# Patient Record
Sex: FEMALE | Race: WHITE | NOT HISPANIC OR LATINO
[De-identification: names, ages, dates, MRNs, and addresses within clinical notes are randomized per-mention and may not be internally consistent; named-entity substitution may affect disease eponyms.]

---

## 2020-02-05 PROBLEM — Z00.00 ENCOUNTER FOR PREVENTIVE HEALTH EXAMINATION: Status: ACTIVE | Noted: 2020-02-05

## 2020-02-12 ENCOUNTER — TRANSCRIPTION ENCOUNTER (OUTPATIENT)
Age: 70
End: 2020-02-12

## 2020-02-12 ENCOUNTER — APPOINTMENT (OUTPATIENT)
Dept: NEUROSURGERY | Facility: CLINIC | Age: 70
End: 2020-02-12
Payer: MEDICAID

## 2020-02-12 DIAGNOSIS — R20.0 ANESTHESIA OF SKIN: ICD-10-CM

## 2020-02-12 PROCEDURE — 99203 OFFICE O/P NEW LOW 30 MIN: CPT

## 2020-02-12 NOTE — HISTORY OF PRESENT ILLNESS
[de-identified] : 70yo [facility] female with limited English speaking ability presents with 5+ year Hx of lancing pain on the left side of the face. She was diagnosed in [facility] and [facility] with TGN and currently being managed with 200mg CBZ up to 4x per day, 75mg lyrica BID and diazepam PRN. She initially had relief from the CBZ but none since. She also has a bulge that forms on the left side of her face occasionally that [place] E[place] with an unremarkable diagnosis. She has lancing pain that started years ago in the V2 distribution and now involved the entire face when it occurs. This is debilitating and sometimes prevents her from eating and drinking. At the most she has 5+ episodes per day. On average she has 1-2 episodes per day. Nothing specifically provokes or relieves the symptoms. No nasal stuffiness. When she has exacerbations she is unable to eat drink or brush her teeth and has severe allodynia (although none at this time). She has not had any advanced imaging of the brain since early 2000's when she had an MRI brain in [facility]. \par \par She also reports intermittent neck pain and RUE paresthesias involving the C5, 6 and 7 dermatomes that clinically correlate into the hand. She denies radicular pain. A full 12-pt ROS was unremarkable other than as noted above.

## 2020-02-12 NOTE — DATA REVIEWED
[de-identified] : Imaging reports of o/p XR L spine and C spine at [facility] on 2/7/20 note DDD of the L spine and C spine with no imaging to review.

## 2020-02-12 NOTE — PHYSICAL EXAM
[General Appearance - In No Acute Distress] : in no acute distress [General Appearance - Alert] : alert [Impaired Insight] : insight and judgment were intact [Oriented To Time, Place, And Person] : oriented to person, place, and time [Affect] : the affect was normal [Person] : oriented to person [Place] : oriented to place [Time] : oriented to time [Cranial Nerves Oculomotor (III)] : extraocular motion intact [Cranial Nerves Optic (II)] : visual acuity intact bilaterally,  pupils equal round and reactive to light [Cranial Nerves Facial (VII)] : face symmetrical [Cranial Nerves Vestibulocochlear (VIII)] : hearing was intact bilaterally [Cranial Nerves Glossopharyngeal (IX)] : tongue and palate midline [Cranial Nerves Accessory (XI - Cranial And Spinal)] : head turning and shoulder shrug symmetric [Cranial Nerves Hypoglossal (XII)] : there was no tongue deviation with protrusion [Motor Strength] : muscle strength was normal in all four extremities [No Muscle Atrophy] : normal bulk in all four extremities [Sensation Tactile Decrease] : light touch was intact [Balance] : balance was intact [2+] : Ankle jerk left 2+ [No Visual Abnormalities] : no visible abnormailities [Full ROM] : full ROM [No Pain with ROM] : no pain with motion in any direction [Normal] : normal [Intact] : all reflexes within normal limits bilaterally [Neck Appearance] : the appearance of the neck was normal [Extraocular Movements] : extraocular movements were intact [] : the neck was supple [Neck Cervical Mass (___cm)] : no neck mass was observed [Abnormal Walk] : normal gait [Involuntary Movements] : no involuntary movements were seen [Motor Tone] : muscle strength and tone were normal [Romberg's Sign] : Romberg's sign was negtive [Dysdiadochokinesia Bilaterally] : not present [Allodynia] : no ~T allodynia present [Tremor] : no tremor present [Coordination - Dysmetria Impaired Finger-to-Nose Bilateral] : not present [Coordination - Dysmetria Impaired Heel-to-Shin Bilateral] : not present [Plantar Reflex Left Only] : normal on the left [Plantar Reflex Right Only] : normal on the right [___] : absent on the left [___] : absent on the right [FreeTextEntry5] : Normal gross sensation to the face to light touch today on exam. Of note an asymmetric and bulge of the soft tissues anterior and inferior to the ear, centered over the mandible and in the region of the parotid gland, non-tender [Galeas] : Galeas's sign was not demonstrated [FreeTextEntry1] : Normal motor exam, non-focal and intact with full strength  [L'Hermitte's] : neck flexion did not produce tingling down the spine/arms [Spurling's - Opposite Side] : Negative Spurling's on opposite side [Spurling's Same Side] : Negative Spurling's on same side [Straight-Leg Raise Test - Right] : straight leg raise  of the right leg was negative [Straight-Leg Raise Test - Left] : straight leg raise of the left leg was negative

## 2020-03-13 ENCOUNTER — FORM ENCOUNTER (OUTPATIENT)
Age: 70
End: 2020-03-13

## 2020-03-14 ENCOUNTER — OUTPATIENT (OUTPATIENT)
Dept: OUTPATIENT SERVICES | Facility: HOSPITAL | Age: 70
LOS: 1 days | End: 2020-03-14
Payer: COMMERCIAL

## 2020-03-14 ENCOUNTER — APPOINTMENT (OUTPATIENT)
Dept: MRI IMAGING | Facility: CLINIC | Age: 70
End: 2020-03-14
Payer: MEDICAID

## 2020-03-14 DIAGNOSIS — G50.0 TRIGEMINAL NEURALGIA: ICD-10-CM

## 2020-03-14 PROCEDURE — 70553 MRI BRAIN STEM W/O & W/DYE: CPT | Mod: 26

## 2020-03-14 PROCEDURE — 72141 MRI NECK SPINE W/O DYE: CPT

## 2020-03-14 PROCEDURE — A9585: CPT

## 2020-03-14 PROCEDURE — 72141 MRI NECK SPINE W/O DYE: CPT | Mod: 26

## 2020-03-14 PROCEDURE — 70553 MRI BRAIN STEM W/O & W/DYE: CPT

## 2020-03-19 ENCOUNTER — APPOINTMENT (OUTPATIENT)
Dept: NEUROLOGY | Facility: CLINIC | Age: 70
End: 2020-03-19

## 2020-03-25 ENCOUNTER — APPOINTMENT (OUTPATIENT)
Dept: NEUROSURGERY | Facility: CLINIC | Age: 70
End: 2020-03-25
Payer: MEDICAID

## 2020-03-25 PROCEDURE — 99214 OFFICE O/P EST MOD 30 MIN: CPT

## 2020-03-25 NOTE — DATA REVIEWED
[de-identified] : MRI brain and FIETSA: ischemic WM changes, there is a looping left SCA that is depressing into the mid portion of the trigeminal nerve in the location of the root entry zone. MRI C spine revealed extensive degenerative changes with a cervical disc hernation and osteophyte complex worst at C5-6 and C6-7. Right C5-6 NF narrowing noted.

## 2020-03-25 NOTE — REASON FOR VISIT
[Family Member] : family member [FreeTextEntry1] : 68yo [facility] female with limited English speaking ability presents with daughter translating: diagnosed in [facility] and [facility] with TGN and currently being managed with 200mg CBZ up to 4x per day, 75mg lyrica BID and diazepam PRN. She has lancing pain that started years ago in the V2 distribution and now involved the entire face when it occurs. This is debilitating and sometimes prevents her from eating and drinking. At the most she has 5+ episodes per day. On average she has 1-2 episodes per day. She also reports intermittent neck pain and RUE paresthesias involving the C5, 6 and 7 dermatomes that clinically correlate into the hand with occasional burning radicular pain into the right hand. She was seen in the office in February and o/p MRI C and brain was ordered. \par \par No significant changes since last visit. She reports her TGN has been stable, no issues since December 2019.

## 2020-03-25 NOTE — PHYSICAL EXAM
[FreeTextEntry1] : Constitutional: alert and in no acute distress. \par Psychiatric: oriented to person, place, and time, insight and judgment were intact and the affect was normal. \par Orientation: oriented to person, oriented to place and oriented to time. \par Cranial Nerves: visual acuity intact bilaterally, pupils equal round and reactive to light, extraocular motion intact, face symmetrical, hearing was intact bilaterally, tongue and palate midline, head turning and shoulder shrug symmetric and there was no tongue deviation with protrusion . Normal gross sensation to the face to light touch today on exam. Of note an asymmetric and bulge of the soft tissues anterior and inferior to the ear, centered over the mandible and in the region of the parotid gland, non-tender. \par Motor: muscle tone was normal in all four extremities, muscle strength was normal in all four extremities, no involuntary movements were seen and normal bulk in all four extremities. \par Normal motor exam, non-focal and intact with full strength. \par Sensory exam: light touch was intact and no allodynia present . Romberg's sign was negtive. \par Coordination:. normal gait. balance was intact. no tremor present. Dysdiadochokinesia was not present. Finger to nose dysmetria was not present. Heel-shin dysmetria was not present. \par Deep tendon reflexes: \par Biceps right 2+. Biceps left 2+.  \par Triceps right 2+. Triceps left 2+.  \par Brachioradialis right 2+. Brachioradialis left 2+.  \par Patella right 2+. Patella left 2+.  \par Ankle jerk right 2+. Ankle jerk left 2+. \par Plantar responses normal on the right, normal on the left. \par Ankle Clonus absent on the right, absent on the left.  \par Other Reflexes: Galeas's sign was not demonstrated. \par Spine: Cervical spine examination demonstrates no visible abnormalities, full ROM and no pain with motion in any direction . neck flexion did not produce tingling down the spine/arms. Negative Spurling's on opposite side. Negative Spurling's on same side. Thoracic spine examination demonstrates no visible abnormailities, full ROM and no pain with motion in any direction. Lumbar/sacral spine examination demonstrates no visible abnormailities, full ROM and no pain with motion in any direction . straight leg raise of the left leg was negative. straight leg raise of the right leg was negative. \par Gait: normal \par Motor Exam: all motor groups within normal limits of strength and tone bilaterally. \par Sensory Exam: all sensory within normal limits bilaterally. \par Deep Tendon Reflexes: all reflexes within normal limits bilaterally. \par Eyes: extraocular movements were intact. \par Neck: the appearance of the neck was normal, the neck was supple and no neck mass was observed. \par Musculoskeletal: normal gait, no involuntary movements were seen and muscle strength and tone were normal.

## 2020-05-18 ENCOUNTER — APPOINTMENT (OUTPATIENT)
Dept: PAIN MANAGEMENT | Facility: CLINIC | Age: 70
End: 2020-05-18

## 2020-05-22 NOTE — REASON FOR VISIT
Oculoplastic Surgeon Procedure Text (A): After obtaining clear surgical margins the patient was sent to oculoplastics for surgical repair.  The patient understands they will receive post-surgical care and follow-up from the referring physician's office. [New Patient Visit] : a new patient visit [Source: ______] : History obtained from [unfilled] [Referred By: _________] : Patient was referred by MYRON

## 2020-06-24 ENCOUNTER — APPOINTMENT (OUTPATIENT)
Dept: NEUROSURGERY | Facility: CLINIC | Age: 70
End: 2020-06-24
Payer: MEDICAID

## 2020-06-24 PROCEDURE — 99213 OFFICE O/P EST LOW 20 MIN: CPT | Mod: 95

## 2020-07-07 ENCOUNTER — APPOINTMENT (OUTPATIENT)
Dept: OTOLARYNGOLOGY | Facility: CLINIC | Age: 70
End: 2020-07-07
Payer: MEDICAID

## 2020-07-07 VITALS
HEIGHT: 63 IN | HEART RATE: 76 BPM | DIASTOLIC BLOOD PRESSURE: 86 MMHG | BODY MASS INDEX: 36.86 KG/M2 | RESPIRATION RATE: 16 BRPM | SYSTOLIC BLOOD PRESSURE: 154 MMHG | WEIGHT: 208 LBS

## 2020-07-07 DIAGNOSIS — Z86.718 PERSONAL HISTORY OF OTHER VENOUS THROMBOSIS AND EMBOLISM: ICD-10-CM

## 2020-07-07 DIAGNOSIS — M50.90 CERVICAL DISC DISORDER, UNSPECIFIED, UNSPECIFIED CERVICAL REGION: ICD-10-CM

## 2020-07-07 DIAGNOSIS — Z86.69 PERSONAL HISTORY OF OTHER DISEASES OF THE NERVOUS SYSTEM AND SENSE ORGANS: ICD-10-CM

## 2020-07-07 DIAGNOSIS — Z86.39 PERSONAL HISTORY OF OTHER ENDOCRINE, NUTRITIONAL AND METABOLIC DISEASE: ICD-10-CM

## 2020-07-07 DIAGNOSIS — I10 ESSENTIAL (PRIMARY) HYPERTENSION: ICD-10-CM

## 2020-07-07 PROCEDURE — 99204 OFFICE O/P NEW MOD 45 MIN: CPT

## 2020-07-07 NOTE — HISTORY OF PRESENT ILLNESS
[Neck Mass] : neck mass [None] : No associated symptoms are reported. [Difficulty Swallowing] : no difficulty swallowing [Otalgia] : no otalgia [de-identified] : Mrs. Laurent is a 71 yo female who is here for parotid mass evaluation.Accompany by daughter Jaison. [place] [place] pt. has a history of trigeminal neuralgia and has had some discomfort. She first noticed mass in 2016 and has noticed the mass increasing in size in the last year. Mass has not gotten biopsied.Pt. feels left facial nubness and pain upon touch and in the  tongue \par Pt. also has cervical disc disease following up with neurologist. 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

## 2020-07-07 NOTE — PHYSICAL EXAM
[Nodule] : nodule [FreeTextEntry1] : mobile 3 cm mass L aprotid.\par \par \par there is no facial weakness [Midline] : trachea located in midline position [Normal] : no rashes

## 2020-07-16 ENCOUNTER — RESULT REVIEW (OUTPATIENT)
Age: 70
End: 2020-07-16

## 2020-07-16 ENCOUNTER — APPOINTMENT (OUTPATIENT)
Dept: ULTRASOUND IMAGING | Facility: IMAGING CENTER | Age: 70
End: 2020-07-16
Payer: MEDICAID

## 2020-07-16 ENCOUNTER — OUTPATIENT (OUTPATIENT)
Dept: OUTPATIENT SERVICES | Facility: HOSPITAL | Age: 70
LOS: 1 days | End: 2020-07-16
Payer: COMMERCIAL

## 2020-07-16 DIAGNOSIS — K11.8 OTHER DISEASES OF SALIVARY GLANDS: ICD-10-CM

## 2020-07-16 PROCEDURE — 88173 CYTOPATH EVAL FNA REPORT: CPT

## 2020-07-16 PROCEDURE — 88173 CYTOPATH EVAL FNA REPORT: CPT | Mod: 26

## 2020-07-16 PROCEDURE — 10005 FNA BX W/US GDN 1ST LES: CPT

## 2020-07-16 PROCEDURE — 88305 TISSUE EXAM BY PATHOLOGIST: CPT | Mod: 26

## 2020-07-16 PROCEDURE — 88172 CYTP DX EVAL FNA 1ST EA SITE: CPT

## 2020-07-16 PROCEDURE — 88305 TISSUE EXAM BY PATHOLOGIST: CPT

## 2020-07-17 LAB — NON-GYNECOLOGICAL CYTOLOGY STUDY: SIGNIFICANT CHANGE UP

## 2020-07-30 ENCOUNTER — APPOINTMENT (OUTPATIENT)
Dept: NEUROLOGY | Facility: CLINIC | Age: 70
End: 2020-07-30
Payer: MEDICAID

## 2020-07-30 VITALS
WEIGHT: 208 LBS | HEIGHT: 63 IN | BODY MASS INDEX: 36.86 KG/M2 | HEART RATE: 72 BPM | SYSTOLIC BLOOD PRESSURE: 127 MMHG | DIASTOLIC BLOOD PRESSURE: 67 MMHG

## 2020-07-30 VITALS — TEMPERATURE: 97.9 F

## 2020-07-30 PROCEDURE — 99203 OFFICE O/P NEW LOW 30 MIN: CPT

## 2020-07-30 RX ORDER — FENOFIBRATE 145 MG/1
145 TABLET, COATED ORAL DAILY
Refills: 0 | Status: ACTIVE | COMMUNITY

## 2020-07-30 RX ORDER — CARBAMAZEPINE 200 MG/1
200 TABLET ORAL
Refills: 0 | Status: ACTIVE | COMMUNITY

## 2020-07-30 RX ORDER — PREGABALIN 75 MG/1
75 CAPSULE ORAL 3 TIMES DAILY
Refills: 0 | Status: ACTIVE | COMMUNITY

## 2020-07-30 RX ORDER — HYDROCHLOROTHIAZIDE 25 MG/1
25 TABLET ORAL DAILY
Refills: 0 | Status: ACTIVE | COMMUNITY

## 2020-07-30 RX ORDER — METOPROLOL TARTRATE 50 MG/1
50 TABLET, FILM COATED ORAL DAILY
Refills: 0 | Status: ACTIVE | COMMUNITY

## 2020-07-30 RX ORDER — ROSUVASTATIN CALCIUM 20 MG/1
20 TABLET, FILM COATED ORAL DAILY
Refills: 0 | Status: ACTIVE | COMMUNITY

## 2020-07-30 RX ORDER — IRBESARTAN 300 MG/1
300 TABLET, FILM COATED ORAL DAILY
Refills: 0 | Status: ACTIVE | COMMUNITY

## 2020-07-30 RX ORDER — PNV NO.95/FERROUS FUM/FOLIC AC 28MG-0.8MG
TABLET ORAL DAILY
Refills: 0 | Status: ACTIVE | COMMUNITY

## 2020-07-30 RX ORDER — MAGNESIUM OXIDE 241.3 MG/1000MG
400 TABLET ORAL DAILY
Refills: 0 | Status: ACTIVE | COMMUNITY

## 2020-07-30 RX ORDER — ALENDRONATE SODIUM 35 MG/1
35 TABLET ORAL
Refills: 0 | Status: ACTIVE | COMMUNITY

## 2020-07-30 RX ORDER — DIAZEPAM 10 MG/1
10 TABLET ORAL
Refills: 0 | Status: ACTIVE | COMMUNITY

## 2020-07-30 NOTE — ASSESSMENT
[FreeTextEntry1] : Will change medication management to trileptal 300mg BID and in 4 weeks will increase to 600mg BID prior to removal of parotid tumor\par \par Will check Na at Sept f/u

## 2020-07-30 NOTE — HISTORY OF PRESENT ILLNESS
[FreeTextEntry1] : Patient history obtained by daughter, she was diagnosed with trigeminal neuralgia in [facility] in late 1990s, carbamazepine helped.  She gets sharp lancinating pain left face in all three divisions trigeminal nerve.  She gets days to weeks of symptoms, and in between she can go for months without symptoms.  She takes lyrica 75mg QD and goes to BID when she gets pain, and takes tegretol 200mg QID with pain, helps but both together make her sleepy and dizzy.  \par \par Has not had a severe episode for many months.

## 2020-07-30 NOTE — PHYSICAL EXAM
[General Appearance - Alert] : alert [General Appearance - In No Acute Distress] : in no acute distress [Person] : oriented to person [Place] : oriented to place [Time] : oriented to time [Short Term Intact] : short term memory intact [Remote Intact] : remote memory intact [Concentration Intact] : normal concentrating ability [Visual Intact] : visual attention was ~T not ~L decreased [Registration Intact] : recent registration memory intact [Naming Objects] : no difficulty naming common objects [Writing A Sentence] : no difficulty writing a sentence [Repeating Phrases] : no difficulty repeating a phrase [Fluency] : fluency intact [Reading] : reading intact [Comprehension] : comprehension intact [Cranial Nerves Oculomotor (III)] : extraocular motion intact [Past History] : adequate knowledge of personal past history [Cranial Nerves Optic (II)] : visual acuity intact bilaterally,  visual fields full to confrontation, pupils equal round and reactive to light [Cranial Nerves Trigeminal (V)] : facial sensation intact symmetrically [Cranial Nerves Vestibulocochlear (VIII)] : hearing was intact bilaterally [Cranial Nerves Facial (VII)] : face symmetrical [Cranial Nerves Hypoglossal (XII)] : there was no tongue deviation with protrusion [Cranial Nerves Glossopharyngeal (IX)] : tongue and palate midline [Cranial Nerves Accessory (XI - Cranial And Spinal)] : head turning and shoulder shrug symmetric [Motor Tone] : muscle tone was normal in all four extremities [No Muscle Atrophy] : normal bulk in all four extremities [Motor Strength] : muscle strength was normal in all four extremities [Motor Handedness Right-Handed] : the patient is right hand dominant [Sensation Tactile Decrease] : light touch was intact [Abnormal Walk] : normal gait [Past-pointing] : there was no past-pointing [Balance] : balance was intact [Tremor] : no tremor present [2+] : Ankle jerk left 2+ [Plantar Reflex Right Only] : normal on the right [Plantar Reflex Left Only] : normal on the left

## 2020-08-28 ENCOUNTER — TRANSCRIPTION ENCOUNTER (OUTPATIENT)
Age: 70
End: 2020-08-28

## 2020-09-15 ENCOUNTER — TRANSCRIPTION ENCOUNTER (OUTPATIENT)
Age: 70
End: 2020-09-15

## 2020-09-21 ENCOUNTER — TRANSCRIPTION ENCOUNTER (OUTPATIENT)
Age: 70
End: 2020-09-21

## 2020-09-30 ENCOUNTER — APPOINTMENT (OUTPATIENT)
Dept: NEUROLOGY | Facility: CLINIC | Age: 70
End: 2020-09-30
Payer: MEDICAID

## 2020-09-30 ENCOUNTER — OUTPATIENT (OUTPATIENT)
Dept: OUTPATIENT SERVICES | Facility: HOSPITAL | Age: 70
LOS: 1 days | End: 2020-09-30
Payer: COMMERCIAL

## 2020-09-30 VITALS
HEART RATE: 73 BPM | DIASTOLIC BLOOD PRESSURE: 82 MMHG | SYSTOLIC BLOOD PRESSURE: 176 MMHG | BODY MASS INDEX: 35.94 KG/M2 | HEIGHT: 63 IN | WEIGHT: 202.82 LBS

## 2020-09-30 VITALS
RESPIRATION RATE: 15 BRPM | TEMPERATURE: 98 F | SYSTOLIC BLOOD PRESSURE: 176 MMHG | WEIGHT: 215.17 LBS | OXYGEN SATURATION: 97 % | DIASTOLIC BLOOD PRESSURE: 88 MMHG | HEART RATE: 73 BPM | HEIGHT: 63 IN

## 2020-09-30 DIAGNOSIS — Z01.818 ENCOUNTER FOR OTHER PREPROCEDURAL EXAMINATION: ICD-10-CM

## 2020-09-30 DIAGNOSIS — Z98.890 OTHER SPECIFIED POSTPROCEDURAL STATES: Chronic | ICD-10-CM

## 2020-09-30 DIAGNOSIS — K11.8 OTHER DISEASES OF SALIVARY GLANDS: ICD-10-CM

## 2020-09-30 DIAGNOSIS — Z90.711 ACQUIRED ABSENCE OF UTERUS WITH REMAINING CERVICAL STUMP: Chronic | ICD-10-CM

## 2020-09-30 PROBLEM — E78.5 HYPERLIPIDEMIA, UNSPECIFIED: Chronic | Status: ACTIVE | Noted: 2020-09-29

## 2020-09-30 PROBLEM — M50.30 OTHER CERVICAL DISC DEGENERATION, UNSPECIFIED CERVICAL REGION: Chronic | Status: ACTIVE | Noted: 2020-09-29

## 2020-09-30 PROBLEM — M51.36 OTHER INTERVERTEBRAL DISC DEGENERATION, LUMBAR REGION: Chronic | Status: ACTIVE | Noted: 2020-09-29

## 2020-09-30 PROBLEM — M19.90 UNSPECIFIED OSTEOARTHRITIS, UNSPECIFIED SITE: Chronic | Status: ACTIVE | Noted: 2020-09-29

## 2020-09-30 PROBLEM — D11.9 BENIGN NEOPLASM OF MAJOR SALIVARY GLAND, UNSPECIFIED: Chronic | Status: ACTIVE | Noted: 2020-09-29

## 2020-09-30 PROBLEM — I10 ESSENTIAL (PRIMARY) HYPERTENSION: Chronic | Status: ACTIVE | Noted: 2020-09-29

## 2020-09-30 PROBLEM — M79.89 OTHER SPECIFIED SOFT TISSUE DISORDERS: Chronic | Status: ACTIVE | Noted: 2020-09-29

## 2020-09-30 PROBLEM — G50.0 TRIGEMINAL NEURALGIA: Chronic | Status: ACTIVE | Noted: 2020-09-29

## 2020-09-30 LAB
ALBUMIN SERPL ELPH-MCNC: 4.5 G/DL — SIGNIFICANT CHANGE UP (ref 3.3–5)
ALP SERPL-CCNC: 41 U/L — SIGNIFICANT CHANGE UP (ref 40–120)
ALT FLD-CCNC: 50 U/L — HIGH (ref 10–45)
ANION GAP SERPL CALC-SCNC: 11 MMOL/L — SIGNIFICANT CHANGE UP (ref 5–17)
AST SERPL-CCNC: 37 U/L — SIGNIFICANT CHANGE UP (ref 10–40)
BILIRUB SERPL-MCNC: 0.3 MG/DL — SIGNIFICANT CHANGE UP (ref 0.2–1.2)
BUN SERPL-MCNC: 12 MG/DL — SIGNIFICANT CHANGE UP (ref 7–23)
CALCIUM SERPL-MCNC: 9.9 MG/DL — SIGNIFICANT CHANGE UP (ref 8.4–10.5)
CHLORIDE SERPL-SCNC: 93 MMOL/L — LOW (ref 96–108)
CO2 SERPL-SCNC: 24 MMOL/L — SIGNIFICANT CHANGE UP (ref 22–31)
CREAT SERPL-MCNC: 0.82 MG/DL — SIGNIFICANT CHANGE UP (ref 0.5–1.3)
GLUCOSE SERPL-MCNC: 99 MG/DL — SIGNIFICANT CHANGE UP (ref 70–99)
HCT VFR BLD CALC: 35.6 % — SIGNIFICANT CHANGE UP (ref 34.5–45)
HGB BLD-MCNC: 12.6 G/DL — SIGNIFICANT CHANGE UP (ref 11.5–15.5)
MCHC RBC-ENTMCNC: 31.3 PG — SIGNIFICANT CHANGE UP (ref 27–34)
MCHC RBC-ENTMCNC: 35.4 GM/DL — SIGNIFICANT CHANGE UP (ref 32–36)
MCV RBC AUTO: 88.3 FL — SIGNIFICANT CHANGE UP (ref 80–100)
NRBC # BLD: 0 /100 WBCS — SIGNIFICANT CHANGE UP (ref 0–0)
PLATELET # BLD AUTO: 300 K/UL — SIGNIFICANT CHANGE UP (ref 150–400)
POTASSIUM SERPL-MCNC: 4.5 MMOL/L — SIGNIFICANT CHANGE UP (ref 3.5–5.3)
POTASSIUM SERPL-SCNC: 4.5 MMOL/L — SIGNIFICANT CHANGE UP (ref 3.5–5.3)
PROT SERPL-MCNC: 8.2 G/DL — SIGNIFICANT CHANGE UP (ref 6–8.3)
RBC # BLD: 4.03 M/UL — SIGNIFICANT CHANGE UP (ref 3.8–5.2)
RBC # FLD: 11.5 % — SIGNIFICANT CHANGE UP (ref 10.3–14.5)
SODIUM SERPL-SCNC: 128 MMOL/L — LOW (ref 135–145)
WBC # BLD: 6.4 K/UL — SIGNIFICANT CHANGE UP (ref 3.8–10.5)
WBC # FLD AUTO: 6.4 K/UL — SIGNIFICANT CHANGE UP (ref 3.8–10.5)

## 2020-09-30 PROCEDURE — 99213 OFFICE O/P EST LOW 20 MIN: CPT

## 2020-09-30 PROCEDURE — 85027 COMPLETE CBC AUTOMATED: CPT

## 2020-09-30 PROCEDURE — G0463: CPT

## 2020-09-30 PROCEDURE — 80053 COMPREHEN METABOLIC PANEL: CPT

## 2020-09-30 RX ORDER — OXCARBAZEPINE 600 MG/1
600 TABLET, FILM COATED ORAL TWICE DAILY
Qty: 60 | Refills: 5 | Status: ACTIVE | COMMUNITY
Start: 2020-09-30 | End: 1900-01-01

## 2020-09-30 NOTE — ASSESSMENT
[FreeTextEntry1] : Will increase trileptal to 600mg BID, and I have told her she can sporadically take an extra 600mg if needed.  \par \par Will check level and sodium today.  \par \par She appears to be maximized for upcoming parotid surgery Oct 12\par \par f/u with me in 6 months or PRN

## 2020-09-30 NOTE — H&P PST ADULT - NSICDXPASTMEDICALHX_GEN_ALL_CORE_FT
PAST MEDICAL HISTORY:  DDD (degenerative disc disease), cervical     DDD (degenerative disc disease), lumbar     Hyperlipidemia     Hypertension     Leg swelling     Osteoarthritis     Trigeminal neuralgia     Warthin's tumor left parotid

## 2020-09-30 NOTE — H&P PST ADULT - NSANTHOSAYNRD_GEN_A_CORE
No. CHAU screening performed.  STOP BANG Legend: 0-2 = LOW Risk; 3-4 = INTERMEDIATE Risk; 5-8 = HIGH Risk

## 2020-09-30 NOTE — H&P PST ADULT - FALLEN IN THE PAST
2/7/2017    PREOPERATIVE DIAGNOSIS: indwelling band with GERD    POSTOPERATIVE DIAGNOSIS: Same and antritis. Single small esophageal erosion at 40 cm    PROCEDURES PERFORMED: Esophagogastroduodenoscopy  with biopsies. SURGEON: Alicia Kurtz. Yung Larsen MD, FACS    ANESTHETIC: MAC anesthetic. ESTIMATED BLOOD LOSS: 5 mL. SPECIMEN: 1. Antral biopsies for H pylori and permanent  2. Fundic biopsies    FINDINGS:   1. LES at 41 cm  2. Single esophageal erosion at 40 cm  3. Mild gastritis/antritis  4. Normal duodenum  5. No evidence of band erosion or slippage    COMPLICATIONS: none  IMPLANTS: none    DESCRIPTION OF PROCEDURE: Patient was sedated in the endoscopy suite and an endoscope was passed transorally through the esophagus into the gastric pouch. On entering the gastric pouch, the gastroesophageal junction was noted to be well defined at 41 cm, without hiatal hernia. A small esophageal erosion was noted at 40 cm separate from the GE junctionLap band  was identified next at 45 cm. The scope was passed through the band channel and  retroflexed in the stomach. No evidence of erosion or slippage was noted. The scope was passed through the antrum and pyloris into the second portion of the duodenum. No duodenal pathology was noted. Biopsy forceps were used to biopsy 2 separate points in the antrum and two other points in the fundus based on antritis and gastropathy findings. These were hemostatic immediately after their biopsies were complete. Air was suctioned from the stomach and the endoscope was removed through the esophagus. No additional pathology identified. no

## 2020-09-30 NOTE — H&P PST ADULT - NSICDXFAMILYHX_GEN_ALL_CORE_FT
FAMILY HISTORY:  Father  Still living? No  Family history of heart attack, Age at diagnosis: Age Unknown    Mother  Still living? No  Family history of leukemia, Age at diagnosis: Age Unknown    Sibling  Still living? Yes, Estimated age: 61-70  Family history of breast cancer, Age at diagnosis: Age Unknown

## 2020-09-30 NOTE — PHYSICAL EXAM
[Person] : oriented to person [Place] : oriented to place [Time] : oriented to time [Short Term Intact] : short term memory intact [Remote Intact] : remote memory intact [Registration Intact] : recent registration memory intact [Concentration Intact] : normal concentrating ability [Visual Intact] : visual attention was ~T not ~L decreased [Naming Objects] : no difficulty naming common objects [Repeating Phrases] : no difficulty repeating a phrase [Writing A Sentence] : no difficulty writing a sentence [Fluency] : fluency intact [Comprehension] : comprehension intact [Reading] : reading intact [Past History] : adequate knowledge of personal past history [Cranial Nerves Optic (II)] : visual acuity intact bilaterally,  visual fields full to confrontation, pupils equal round and reactive to light [Cranial Nerves Oculomotor (III)] : extraocular motion intact [Cranial Nerves Trigeminal (V)] : facial sensation intact symmetrically [Cranial Nerves Facial (VII)] : face symmetrical [Cranial Nerves Vestibulocochlear (VIII)] : hearing was intact bilaterally [Cranial Nerves Glossopharyngeal (IX)] : tongue and palate midline [Cranial Nerves Accessory (XI - Cranial And Spinal)] : head turning and shoulder shrug symmetric [Cranial Nerves Hypoglossal (XII)] : there was no tongue deviation with protrusion [Motor Tone] : muscle tone was normal in all four extremities [Motor Strength] : muscle strength was normal in all four extremities [No Muscle Atrophy] : normal bulk in all four extremities [Motor Handedness Right-Handed] : the patient is right hand dominant [Sensation Tactile Decrease] : light touch was intact [Abnormal Walk] : normal gait [Balance] : balance was intact [Past-pointing] : there was no past-pointing [Tremor] : no tremor present [2+] : Ankle jerk left 2+ [Plantar Reflex Right Only] : normal on the right [Plantar Reflex Left Only] : normal on the left

## 2020-09-30 NOTE — H&P PST ADULT - NSICDXPASTSURGICALHX_GEN_ALL_CORE_FT
PAST SURGICAL HISTORY:  H/O varicose vein ligation 2006 left and 2001 right    History of partial hysterectomy benign mass, 1998

## 2020-09-30 NOTE — HISTORY OF PRESENT ILLNESS
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  \par \par

## 2020-09-30 NOTE — H&P PST ADULT - ASSESSMENT
71 yoanny Guzman sp F for left parotidectomy w [facility] section     Medical clearance pending w labs

## 2020-10-08 DIAGNOSIS — Z01.818 ENCOUNTER FOR OTHER PREPROCEDURAL EXAMINATION: ICD-10-CM

## 2020-10-09 ENCOUNTER — APPOINTMENT (OUTPATIENT)
Dept: DISASTER EMERGENCY | Facility: CLINIC | Age: 70
End: 2020-10-09

## 2020-10-09 LAB — SARS-COV-2 N GENE NPH QL NAA+PROBE: NOT DETECTED

## 2020-10-12 ENCOUNTER — TRANSCRIPTION ENCOUNTER (OUTPATIENT)
Age: 70
End: 2020-10-12

## 2020-10-12 ENCOUNTER — INPATIENT (INPATIENT)
Facility: HOSPITAL | Age: 70
LOS: 2 days | Discharge: HOME | DRG: 641 | End: 2020-10-15
Attending: INTERNAL MEDICINE | Admitting: INTERNAL MEDICINE
Payer: COMMERCIAL

## 2020-10-12 ENCOUNTER — APPOINTMENT (OUTPATIENT)
Dept: OTOLARYNGOLOGY | Facility: HOSPITAL | Age: 70
End: 2020-10-12

## 2020-10-12 VITALS
HEART RATE: 71 BPM | TEMPERATURE: 98 F | SYSTOLIC BLOOD PRESSURE: 163 MMHG | WEIGHT: 215.17 LBS | RESPIRATION RATE: 16 BRPM | DIASTOLIC BLOOD PRESSURE: 81 MMHG | HEIGHT: 63 IN | OXYGEN SATURATION: 96 %

## 2020-10-12 DIAGNOSIS — K11.8 OTHER DISEASES OF SALIVARY GLANDS: ICD-10-CM

## 2020-10-12 DIAGNOSIS — Z90.711 ACQUIRED ABSENCE OF UTERUS WITH REMAINING CERVICAL STUMP: Chronic | ICD-10-CM

## 2020-10-12 DIAGNOSIS — Z98.890 OTHER SPECIFIED POSTPROCEDURAL STATES: Chronic | ICD-10-CM

## 2020-10-12 DIAGNOSIS — E87.1 HYPO-OSMOLALITY AND HYPONATREMIA: ICD-10-CM

## 2020-10-12 LAB
ANION GAP SERPL CALC-SCNC: 11 MMOL/L — SIGNIFICANT CHANGE UP (ref 5–17)
ANION GAP SERPL CALC-SCNC: 8 MMOL/L — SIGNIFICANT CHANGE UP (ref 5–17)
ANION GAP SERPL CALC-SCNC: 9 MMOL/L — SIGNIFICANT CHANGE UP (ref 5–17)
ANION GAP SERPL CALC-SCNC: 9 MMOL/L — SIGNIFICANT CHANGE UP (ref 5–17)
APPEARANCE UR: CLEAR — SIGNIFICANT CHANGE UP
BILIRUB UR-MCNC: NEGATIVE — SIGNIFICANT CHANGE UP
BUN SERPL-MCNC: 15 MG/DL — SIGNIFICANT CHANGE UP (ref 7–23)
BUN SERPL-MCNC: 7 MG/DL — SIGNIFICANT CHANGE UP (ref 7–23)
BUN SERPL-MCNC: 9 MG/DL — SIGNIFICANT CHANGE UP (ref 7–23)
BUN SERPL-MCNC: 9 MG/DL — SIGNIFICANT CHANGE UP (ref 7–23)
CALCIUM SERPL-MCNC: 8.6 MG/DL — SIGNIFICANT CHANGE UP (ref 8.4–10.5)
CALCIUM SERPL-MCNC: 8.8 MG/DL — SIGNIFICANT CHANGE UP (ref 8.4–10.5)
CALCIUM SERPL-MCNC: 8.9 MG/DL — SIGNIFICANT CHANGE UP (ref 8.4–10.5)
CALCIUM SERPL-MCNC: 9.7 MG/DL — SIGNIFICANT CHANGE UP (ref 8.4–10.5)
CHLORIDE SERPL-SCNC: 87 MMOL/L — LOW (ref 96–108)
CHLORIDE SERPL-SCNC: 87 MMOL/L — LOW (ref 96–108)
CHLORIDE SERPL-SCNC: 89 MMOL/L — LOW (ref 96–108)
CHLORIDE SERPL-SCNC: 89 MMOL/L — LOW (ref 96–108)
CHLORIDE UR-SCNC: 85 MMOL/L — SIGNIFICANT CHANGE UP
CO2 SERPL-SCNC: 22 MMOL/L — SIGNIFICANT CHANGE UP (ref 22–31)
CO2 SERPL-SCNC: 24 MMOL/L — SIGNIFICANT CHANGE UP (ref 22–31)
CO2 SERPL-SCNC: 25 MMOL/L — SIGNIFICANT CHANGE UP (ref 22–31)
CO2 SERPL-SCNC: 25 MMOL/L — SIGNIFICANT CHANGE UP (ref 22–31)
COLOR SPEC: YELLOW — SIGNIFICANT CHANGE UP
CREAT ?TM UR-MCNC: 35 MG/DL — SIGNIFICANT CHANGE UP
CREAT SERPL-MCNC: 0.65 MG/DL — SIGNIFICANT CHANGE UP (ref 0.5–1.3)
CREAT SERPL-MCNC: 0.66 MG/DL — SIGNIFICANT CHANGE UP (ref 0.5–1.3)
CREAT SERPL-MCNC: 0.71 MG/DL — SIGNIFICANT CHANGE UP (ref 0.5–1.3)
CREAT SERPL-MCNC: 1.05 MG/DL — SIGNIFICANT CHANGE UP (ref 0.5–1.3)
DIFF PNL FLD: NEGATIVE — SIGNIFICANT CHANGE UP
GLUCOSE SERPL-MCNC: 112 MG/DL — HIGH (ref 70–99)
GLUCOSE SERPL-MCNC: 114 MG/DL — HIGH (ref 70–99)
GLUCOSE SERPL-MCNC: 115 MG/DL — HIGH (ref 70–99)
GLUCOSE SERPL-MCNC: 116 MG/DL — HIGH (ref 70–99)
GLUCOSE UR QL: NEGATIVE — SIGNIFICANT CHANGE UP
KETONES UR-MCNC: NEGATIVE — SIGNIFICANT CHANGE UP
LEUKOCYTE ESTERASE UR-ACNC: NEGATIVE — SIGNIFICANT CHANGE UP
NITRITE UR-MCNC: NEGATIVE — SIGNIFICANT CHANGE UP
OSMOLALITY UR: 341 MOSM/KG — SIGNIFICANT CHANGE UP (ref 50–1200)
PH UR: 8 — SIGNIFICANT CHANGE UP (ref 5–8)
POTASSIUM SERPL-MCNC: 3.8 MMOL/L — SIGNIFICANT CHANGE UP (ref 3.5–5.3)
POTASSIUM SERPL-MCNC: 4 MMOL/L — SIGNIFICANT CHANGE UP (ref 3.5–5.3)
POTASSIUM SERPL-MCNC: 4.4 MMOL/L — SIGNIFICANT CHANGE UP (ref 3.5–5.3)
POTASSIUM SERPL-MCNC: 4.6 MMOL/L — SIGNIFICANT CHANGE UP (ref 3.5–5.3)
POTASSIUM SERPL-SCNC: 3.8 MMOL/L — SIGNIFICANT CHANGE UP (ref 3.5–5.3)
POTASSIUM SERPL-SCNC: 4 MMOL/L — SIGNIFICANT CHANGE UP (ref 3.5–5.3)
POTASSIUM SERPL-SCNC: 4.4 MMOL/L — SIGNIFICANT CHANGE UP (ref 3.5–5.3)
POTASSIUM SERPL-SCNC: 4.6 MMOL/L — SIGNIFICANT CHANGE UP (ref 3.5–5.3)
PROT ?TM UR-MCNC: 4 MG/DL — SIGNIFICANT CHANGE UP (ref 0–12)
PROT UR-MCNC: NEGATIVE — SIGNIFICANT CHANGE UP
PROT/CREAT UR-RTO: 0.1 RATIO — SIGNIFICANT CHANGE UP (ref 0–0.2)
SODIUM SERPL-SCNC: 120 MMOL/L — CRITICAL LOW (ref 135–145)
SODIUM SERPL-SCNC: 120 MMOL/L — CRITICAL LOW (ref 135–145)
SODIUM SERPL-SCNC: 122 MMOL/L — LOW (ref 135–145)
SODIUM SERPL-SCNC: 123 MMOL/L — LOW (ref 135–145)
SODIUM UR-SCNC: 110 MMOL/L — SIGNIFICANT CHANGE UP
SP GR SPEC: 1.01 — SIGNIFICANT CHANGE UP (ref 1.01–1.02)
URATE UR-MCNC: 26.6 MG/DL — SIGNIFICANT CHANGE UP
UROBILINOGEN FLD QL: NEGATIVE — SIGNIFICANT CHANGE UP

## 2020-10-12 RX ORDER — ATORVASTATIN CALCIUM 80 MG/1
80 TABLET, FILM COATED ORAL AT BEDTIME
Refills: 0 | Status: DISCONTINUED | OUTPATIENT
Start: 2020-10-12 | End: 2020-10-15

## 2020-10-12 RX ORDER — MULTIVIT-MIN/FERROUS GLUCONATE 9 MG/15 ML
1 LIQUID (ML) ORAL DAILY
Refills: 0 | Status: DISCONTINUED | OUTPATIENT
Start: 2020-10-12 | End: 2020-10-15

## 2020-10-12 RX ORDER — METOPROLOL TARTRATE 50 MG
25 TABLET ORAL ONCE
Refills: 0 | Status: COMPLETED | OUTPATIENT
Start: 2020-10-12 | End: 2020-10-12

## 2020-10-12 RX ORDER — SODIUM CHLORIDE 9 MG/ML
1000 INJECTION, SOLUTION INTRAVENOUS
Refills: 0 | Status: DISCONTINUED | OUTPATIENT
Start: 2020-10-12 | End: 2020-10-12

## 2020-10-12 RX ORDER — METOPROLOL TARTRATE 50 MG
50 TABLET ORAL DAILY
Refills: 0 | Status: DISCONTINUED | OUTPATIENT
Start: 2020-10-13 | End: 2020-10-14

## 2020-10-12 RX ORDER — HYDRALAZINE HCL 50 MG
10 TABLET ORAL EVERY 6 HOURS
Refills: 0 | Status: DISCONTINUED | OUTPATIENT
Start: 2020-10-12 | End: 2020-10-13

## 2020-10-12 RX ORDER — LOSARTAN POTASSIUM 100 MG/1
100 TABLET, FILM COATED ORAL DAILY
Refills: 0 | Status: DISCONTINUED | OUTPATIENT
Start: 2020-10-12 | End: 2020-10-14

## 2020-10-12 RX ORDER — DIAZEPAM 5 MG
10 TABLET ORAL THREE TIMES A DAY
Refills: 0 | Status: DISCONTINUED | OUTPATIENT
Start: 2020-10-12 | End: 2020-10-15

## 2020-10-12 RX ORDER — FENOFIBRATE,MICRONIZED 130 MG
145 CAPSULE ORAL DAILY
Refills: 0 | Status: DISCONTINUED | OUTPATIENT
Start: 2020-10-12 | End: 2020-10-15

## 2020-10-12 RX ORDER — OXCARBAZEPINE 300 MG/1
300 TABLET, FILM COATED ORAL
Refills: 0 | Status: DISCONTINUED | OUTPATIENT
Start: 2020-10-12 | End: 2020-10-15

## 2020-10-12 RX ORDER — PREGABALIN 225 MG/1
1000 CAPSULE ORAL DAILY
Refills: 0 | Status: DISCONTINUED | OUTPATIENT
Start: 2020-10-12 | End: 2020-10-15

## 2020-10-12 RX ORDER — HYDRALAZINE HCL 50 MG
10 TABLET ORAL ONCE
Refills: 0 | Status: COMPLETED | OUTPATIENT
Start: 2020-10-12 | End: 2020-10-12

## 2020-10-12 RX ORDER — MAGNESIUM OXIDE 400 MG ORAL TABLET 241.3 MG
400 TABLET ORAL DAILY
Refills: 0 | Status: DISCONTINUED | OUTPATIENT
Start: 2020-10-12 | End: 2020-10-15

## 2020-10-12 RX ADMIN — Medication 145 MILLIGRAM(S): at 17:34

## 2020-10-12 RX ADMIN — OXCARBAZEPINE 300 MILLIGRAM(S): 300 TABLET, FILM COATED ORAL at 17:39

## 2020-10-12 RX ADMIN — ATORVASTATIN CALCIUM 80 MILLIGRAM(S): 80 TABLET, FILM COATED ORAL at 21:08

## 2020-10-12 RX ADMIN — Medication 25 MILLIGRAM(S): at 15:49

## 2020-10-12 RX ADMIN — Medication 1 TABLET(S): at 17:35

## 2020-10-12 RX ADMIN — MAGNESIUM OXIDE 400 MG ORAL TABLET 400 MILLIGRAM(S): 241.3 TABLET ORAL at 17:34

## 2020-10-12 RX ADMIN — Medication 1 TABLET(S): at 17:34

## 2020-10-12 RX ADMIN — SODIUM CHLORIDE 50 MILLILITER(S): 9 INJECTION, SOLUTION INTRAVENOUS at 10:07

## 2020-10-12 RX ADMIN — Medication 10 MILLIGRAM(S): at 15:51

## 2020-10-12 RX ADMIN — PREGABALIN 1000 MICROGRAM(S): 225 CAPSULE ORAL at 17:34

## 2020-10-12 NOTE — H&P ADULT - NSHPLABSRESULTS_GEN_ALL_CORE
Labs      10-12    123<L>  |  89<L>  |  7   ----------------------------<  116<H>  4.0   |  25  |  0.65    Ca    8.9      12 Oct 2020 14:30              Lactic Acid Trend          Radiology

## 2020-10-12 NOTE — H&P ADULT - NSHPREVIEWOFSYSTEMS_GEN_ALL_CORE
Review of Systems:   	CONSTITUTIONAL: Denies fever, weight loss, or fatigue  	ENT: Denies dysphagia, difficulty chewing, tinnitus, blurred vision, double vision  	RESPIRATORY: Denies cough, wheeze, hemoptysis, shortness of breath  	CARDIOVASCULAR: Denies chest pain, palpitations irregular HR  	GASTROINTESTINAL:  Denies nausea, vomiting, abdominal pain, diarrhea, constipation, melena, BRBPR, food intolerances  	GENITOURINARY: Denies dysuria, hematuria, urinary frequency, urinary incontinence  	NEUROLOGICAL: Denies headaches, syncope, near syncope, dizziness, lightheadedness, headaches, seizures  	SKIN: Denies rashes, masses, bruising, lesions   	MUSCULOSKELETAL: Denies history of OA or gout, joint swelling  	PSYCHIATRIC: Denies depression, anxiety, mood swings, or difficulty sleeping  	HEME: Denies history of DVT/PE, blood transfusion Review of Systems:   	CONSTITUTIONAL: Denies fever, weight loss, or fatigue  	ENT: + dysphagia, chewing. Denies tinnitus, blurred vision, double vision  	RESPIRATORY: Denies cough, wheeze, hemoptysis, shortness of breath  	CARDIOVASCULAR: Denies chest pain, palpitations irregular HR  	GASTROINTESTINAL:  Denies nausea, vomiting, abdominal pain, diarrhea, constipation, melena, BRBPR, food intolerances  	GENITOURINARY: Denies dysuria, hematuria, urinary frequency, urinary incontinence  	NEUROLOGICAL: Denies headaches, syncope, near syncope, dizziness, lightheadedness, headaches, seizures  	SKIN: Denies rashes, masses, bruising, lesions   	MUSCULOSKELETAL: Denies history of OA or gout, joint swelling  	PSYCHIATRIC: Denies depression, anxiety, mood swings, or difficulty sleeping  	HEME: Denies history of DVT/PE, blood transfusion

## 2020-10-12 NOTE — CONSULT NOTE ADULT - SUBJECTIVE AND OBJECTIVE BOX
Neurology consult    RENE [id number]     Patient is a 70y old  Female who presents with a chief complaint of Hyponatremia (12 Oct 2020 15:44)      HPI:  Source: Patient  Reliability: Good    CC:  Hyponatremia    HPI:  This is a 70 year-old-female with PMHx of HTN who was admitted to the critical care unit for hyponatremia. Patient was originally presented to the ambulatory surgery unit today for left parotid gland tumor removal. Presurgical testing labs showed sodium of 120. Patient was recently taking hydrochlorothiazide and Trileptal for trigeminal. Critical care was called for hyponatremia and electrolyte imbalance.     Patient takes oxcarbazepine 600 mg Q 12 hour for last 2 weeks previously receiving 300 mg Q 12 hours.  In past she received carbamazepine with partial response for her trigeminal neuralgia.    PMH    PSH    Allergies    Keflex (Rash)    Intolerances        MEDICATIONS  (STANDING):  atorvastatin 80 milliGRAM(s) Oral at bedtime  calcium carbonate 1250 mG  + Vitamin D (OsCal 500 + D) 1 Tablet(s) Oral two times a day  cyanocobalamin 1000 MICROGram(s) Oral daily  fenofibrate Tablet 145 milliGRAM(s) Oral daily  hydrALAZINE Injectable 10 milliGRAM(s) IV Push once  losartan 100 milliGRAM(s) Oral daily  magnesium oxide 400 milliGRAM(s) Oral daily  metoprolol tartrate 25 milliGRAM(s) Oral once  multivitamin/minerals 1 Tablet(s) Oral daily    MEDICATIONS  (PRN):  diazepam    Tablet 10 milliGRAM(s) Oral three times a day PRN anxiety  hydrALAZINE Injectable 10 milliGRAM(s) IV Push every 6 hours PRN SBP > 140      Psoc    Pfam          Vital Signs Last 24 Hrs  T(C): 36.6 (12 Oct 2020 12:00), Max: 37 (12 Oct 2020 11:12)  T(F): 97.8 (12 Oct 2020 12:00), Max: 98.6 (12 Oct 2020 11:12)  HR: 67 (12 Oct 2020 15:00) (58 - 80)  BP: 138/73 (12 Oct 2020 15:00) (135/69 - 179/93)  BP(mean): 89 (12 Oct 2020 15:00) (89 - 100)  RR: 20 (12 Oct 2020 15:00) (7 - 25)  SpO2: 97% (12 Oct 2020 15:00) (96% - 100%)     (12 Oct 2020 15:44)      REVIEW OF SYSTEMS:    Constitutional: No fever, chills, fatigue, weakness  Eyes: no eye pain, visual disturbances, or discharge  ENT:  No difficulty hearing, tinnitus, vertigo; No sinus or throat pain  Neck: No pain or stiffness  Respiratory: No cough, dyspnea, wheezing   Cardiovascular: No chest pain, palpitations,   Gastrointestinal: No abdominal or epigastric pain. No nausea, vomiting  No diarrhea or constipation.   Genitourinary: No dysuria, frequency, hematuria or incontinence  Neurological: No headaches, lightheadedness, vertigo, numbness or tremors  Psychiatric: No depression, anxiety, mood swings or difficulty sleeping  Musculoskeletal: No joint pain or swelling; No muscle, back or extremity pain  Skin: No itching, burning, rashes or lesions   Lymph Nodes: No enlarged glands  Endocrine: No heat or cold intolerance; No hair loss, No h/o diabetes or thyroid dysfunction  Allergy and Immunologic: No hives or eczema    MEDICATIONS    atorvastatin 80 milliGRAM(s) Oral at bedtime  calcium carbonate 1250 mG  + Vitamin D (OsCal 500 + D) 1 Tablet(s) Oral two times a day  cyanocobalamin 1000 MICROGram(s) Oral daily  diazepam    Tablet 10 milliGRAM(s) Oral three times a day PRN  fenofibrate Tablet 145 milliGRAM(s) Oral daily  hydrALAZINE Injectable 10 milliGRAM(s) IV Push every 6 hours PRN  losartan 100 milliGRAM(s) Oral daily  magnesium oxide 400 milliGRAM(s) Oral daily  multivitamin/minerals 1 Tablet(s) Oral daily  OXcarbazepine 300 milliGRAM(s) Oral two times a day      PMH: Warthin&#x27;s tumor    Leg swelling    Hypertension    DDD (degenerative disc disease), cervical    DDD (degenerative disc disease), lumbar    Osteoarthritis    Hyperlipidemia    Trigeminal neuralgia         PSH: History of partial hysterectomy    H/O varicose vein ligation        Family history:   FAMILY HISTORY:  Family history of leukemia (Mother)    Family history of breast cancer (Sibling)    Family history of heart attack (Father)        SOCIAL HISTORY:  No history of tobacco or alcohol use     Allergies    Keflex (Rash)    Intolerances        Height (cm): 160 (10-12 @ 12:00)  Weight (kg): 98.1 (10- @ 12:00)  BMI (kg/m2): 38.3 (10-12 @ 12:00)    Vital Signs Last 24 Hrs  T(C): 36.8 (12 Oct 2020 15:00), Max: 37 (12 Oct 2020 11:12)  T(F): 98.3 (12 Oct 2020 15:00), Max: 98.6 (12 Oct 2020 11:12)  HR: 72 (12 Oct 2020 16:00) (58 - 80)  BP: 118/54 (12 Oct 2020 16:00) (118/54 - 179/93)  BP(mean): 72 (12 Oct 2020 16:00) (72 - 100)  RR: 18 (12 Oct 2020 16:00) (7 - 25)  SpO2: 98% (12 Oct 2020 16:00) (96% - 100%)      On Neurological Examination:    Head: normocephalic Neck: supple no carotid bruits    Mental Status - Patient is alert, awake, oriented speech intact    Cranial Nerves - PERRL, EOMI, VFF, normal V through XII no nystagmus    Motor Exam :  No drift normal strength tone and coordination no abnormal movements no focality    Sensory    Intact to pin    Reflexes:  symmetric plantars downgoing                                                             LABS:    Urinalysis Basic - ( 12 Oct 2020 11:15 )    Color: Yellow / Appearance: Clear / S.010 / pH: x  Gluc: x / Ketone: Negative  / Bili: Negative / Urobili: Negative   Blood: x / Protein: Negative / Nitrite: Negative   Leuk Esterase: Negative / RBC: x / WBC x   Sq Epi: x / Non Sq Epi: x / Bacteria: x      10-12    123<L>  |  89<L>  |  7   ----------------------------<  116<H>  4.0   |  25  |  0.65    Ca    8.9      12 Oct 2020 14:30        Hemoglobin A1C:

## 2020-10-12 NOTE — PROVIDER CONTACT NOTE (CRITICAL VALUE NOTIFICATION) - ACTION/TREATMENT ORDERED:
Dr Vance from anesthesia notified. Repeat sodium level ordered
Surgery cancelled and will be admitted

## 2020-10-12 NOTE — H&P ADULT - HISTORY OF PRESENT ILLNESS
Source: Patient  Reliability: Good    CC:  Hyponatremia    HPI:  This is a 70 year-old-female with PMHx of HTN who was admitted to the critical care unit for hyponatremia. Patient was originally presented to the ambulatory surgery unit today for left parotid gland tumor removal. Presurgical testing labs showed sodium of 120. Patient was recently taking hydrochlorothiazide and     PMH    PSH    Allergies    Keflex (Rash)    Intolerances        MEDICATIONS  (STANDING):  atorvastatin 80 milliGRAM(s) Oral at bedtime  calcium carbonate 1250 mG  + Vitamin D (OsCal 500 + D) 1 Tablet(s) Oral two times a day  cyanocobalamin 1000 MICROGram(s) Oral daily  fenofibrate Tablet 145 milliGRAM(s) Oral daily  hydrALAZINE Injectable 10 milliGRAM(s) IV Push once  losartan 100 milliGRAM(s) Oral daily  magnesium oxide 400 milliGRAM(s) Oral daily  metoprolol tartrate 25 milliGRAM(s) Oral once  multivitamin/minerals 1 Tablet(s) Oral daily    MEDICATIONS  (PRN):  diazepam    Tablet 10 milliGRAM(s) Oral three times a day PRN anxiety  hydrALAZINE Injectable 10 milliGRAM(s) IV Push every 6 hours PRN SBP > 140      Psoc    Pfam          Vital Signs Last 24 Hrs  T(C): 36.6 (12 Oct 2020 12:00), Max: 37 (12 Oct 2020 11:12)  T(F): 97.8 (12 Oct 2020 12:00), Max: 98.6 (12 Oct 2020 11:12)  HR: 67 (12 Oct 2020 15:00) (58 - 80)  BP: 138/73 (12 Oct 2020 15:00) (135/69 - 179/93)  BP(mean): 89 (12 Oct 2020 15:00) (89 - 100)  RR: 20 (12 Oct 2020 15:00) (7 - 25)  SpO2: 97% (12 Oct 2020 15:00) (96% - 100%)     Source: Patient  Reliability: Good    CC:  Hyponatremia    HPI:  This is a 70 year-old-female with PMHx of HTN who was admitted to the critical care unit for hyponatremia. Patient was originally presented to the ambulatory surgery unit today for left parotid gland tumor removal. Presurgical testing labs showed sodium of 120. Patient was recently taking hydrochlorothiazide and Trileptal for trigeminal. Critical care was called for hyponatremia and electrolyte imbalance.     PMH    PSH    Allergies    Keflex (Rash)    Intolerances        MEDICATIONS  (STANDING):  atorvastatin 80 milliGRAM(s) Oral at bedtime  calcium carbonate 1250 mG  + Vitamin D (OsCal 500 + D) 1 Tablet(s) Oral two times a day  cyanocobalamin 1000 MICROGram(s) Oral daily  fenofibrate Tablet 145 milliGRAM(s) Oral daily  hydrALAZINE Injectable 10 milliGRAM(s) IV Push once  losartan 100 milliGRAM(s) Oral daily  magnesium oxide 400 milliGRAM(s) Oral daily  metoprolol tartrate 25 milliGRAM(s) Oral once  multivitamin/minerals 1 Tablet(s) Oral daily    MEDICATIONS  (PRN):  diazepam    Tablet 10 milliGRAM(s) Oral three times a day PRN anxiety  hydrALAZINE Injectable 10 milliGRAM(s) IV Push every 6 hours PRN SBP > 140      Psoc    Pfam          Vital Signs Last 24 Hrs  T(C): 36.6 (12 Oct 2020 12:00), Max: 37 (12 Oct 2020 11:12)  T(F): 97.8 (12 Oct 2020 12:00), Max: 98.6 (12 Oct 2020 11:12)  HR: 67 (12 Oct 2020 15:00) (58 - 80)  BP: 138/73 (12 Oct 2020 15:00) (135/69 - 179/93)  BP(mean): 89 (12 Oct 2020 15:00) (89 - 100)  RR: 20 (12 Oct 2020 15:00) (7 - 25)  SpO2: 97% (12 Oct 2020 15:00) (96% - 100%)     Source: Patient  Reliability: Good    CC:  Hyponatremia    HPI:  This is a 70 year-old-female with PMHx of HTN who was admitted to the critical care unit for hyponatremia. Patient was originally presented to the ambulatory surgery unit today for left parotid gland tumor removal. Presurgical testing labs showed sodium of 120. Patient was recently taking hydrochlorothiazide and Trileptal for trigeminal. Critical care was called for hyponatremia and electrolyte imbalance. Patient denies dizziness, shortness of breath, chest pain, palpitations, cough or sputum production    PMH:  Hypertension    PSH:  s/p hysterectomy - fibroids in   s/p varicose vein surgery - right leg in   s/p varicose vein surgery - left leg in     Allergies    Keflex (Rash), hives      MEDICATIONS  (STANDING):  atorvastatin 80 milliGRAM(s) Oral at bedtime  calcium carbonate 1250 mG  + Vitamin D (OsCal 500 + D) 1 Tablet(s) Oral two times a day  cyanocobalamin 1000 MICROGram(s) Oral daily  fenofibrate Tablet 145 milliGRAM(s) Oral daily  hydrALAZINE Injectable 10 milliGRAM(s) IV Push once  losartan 100 milliGRAM(s) Oral daily  magnesium oxide 400 milliGRAM(s) Oral daily  metoprolol tartrate 25 milliGRAM(s) Oral once  multivitamin/minerals 1 Tablet(s) Oral daily    MEDICATIONS  (PRN):  diazepam    Tablet 10 milliGRAM(s) Oral three times a day PRN anxiety  hydrALAZINE Injectable 10 milliGRAM(s) IV Push every 6 hours PRN SBP > 140      Psoc:   Retired  in construction industry    Pfam:  Dad-  at age 65 - MI  Mom-  at age 56 - Leukemia      Vital Signs Last 24 Hrs  T(C): 36.6 (12 Oct 2020 12:00), Max: 37 (12 Oct 2020 11:12)  T(F): 97.8 (12 Oct 2020 12:00), Max: 98.6 (12 Oct 2020 11:12)  HR: 67 (12 Oct 2020 15:00) (58 - 80)  BP: 138/73 (12 Oct 2020 15:00) (135/69 - 179/93)  BP(mean): 89 (12 Oct 2020 15:00) (89 - 100)  RR: 20 (12 Oct 2020 15:00) (7 - 25)  SpO2: 97% (12 Oct 2020 15:00) (96% - 100%)

## 2020-10-12 NOTE — H&P ADULT - ASSESSMENT
70 year-old-female with PMHx of HTN who was admitted to the critical care unit for hyponatremia.  1. Hyponatremia  2. Parotid gland tumor  3. Hypertension  4. Trigeminal neuralgia    -Transferred to critical care  -Fluid restriction  -Continue anti-hypertensive meds  -Restart Trileptal at lower dose  -Hold Hydrochlorothiazide  -Monitor BP and vital signs  -Monitor sodium, bmp q6 hours  -Renal and neurology consults  -case d/w patient and patient's daughter at bedside 70 year-old-female with PMHx of HTN who was admitted to the critical care unit for hyponatremia.  1. Normovolemic  Hyponatremia SIADH- asymptomatic, suspect combination oxcarbamazepine use (as increased dose) and HCTZ use  2. Parotid gland tumor   3. Hypertension  4. Trigeminal neuralgia    -Transferred to critical care  -Hold Fluids  -Continue anti-hypertensive meds but hold HCTZ  -Neuro eval  - Renal eval  -Check Thyroid, cortisol uric acid  -Hold Hydrochlorothiazide  -Monitor BP and vital signs  -Monitor sodium, bmp q6 hours  -Renal and neurology consults  -case d/w patient and patient's daughter at bedside

## 2020-10-12 NOTE — CONSULT NOTE ADULT - SUBJECTIVE AND OBJECTIVE BOX
NEPHROLOGY CONSULTATION    CC:  Hyponatremia    HPI:  Pt is 71 yo female with PMH of HTN who was admitted to the critical care unit for hyponatremia. Patient was originally presented to the ambulatory surgery unit today for left parotid gland tumor removal. Presurgical testing showed sodium of 120. Patient was recently taking hydrochlorothiazide for HTN and Trileptal for trigeminal neuralgia. She is alert, awake, denies CP, SOB, n/v/d/c/f/c/dysuria. Reports drinking lots of water.    ROS: as above    PMH: as above  left parotid tumor  Hypertension  DDD (degenerative disc disease), cervical, lumbar  Osteoarthritis  Hyperlipidemia  Trigeminal neuralgia  History of partial hysterectomy, benign mass,   H/O varicose vein ligation,  left and  right    Allergies:  Keflex (Rash)    MEDICATIONS  (STANDING):  atorvastatin 80 milliGRAM(s) Oral at bedtime  calcium carbonate 1250 mG  + Vitamin D (OsCal 500 + D) 1 Tablet(s) Oral two times a day  cyanocobalamin 1000 MICROGram(s) Oral daily  fenofibrate Tablet 145 milliGRAM(s) Oral daily  hydrALAZINE Injectable 10 milliGRAM(s) IV Push once  losartan 100 milliGRAM(s) Oral daily  magnesium oxide 400 milliGRAM(s) Oral daily  metoprolol tartrate 25 milliGRAM(s) Oral once  multivitamin/minerals 1 Tablet(s) Oral daily    atorvastatin 80 milliGRAM(s) Oral at bedtime  calcium carbonate 1250 mG  + Vitamin D (OsCal 500 + D) 1 Tablet(s) Oral two times a day  cyanocobalamin 1000 MICROGram(s) Oral daily  diazepam    Tablet 10 milliGRAM(s) Oral three times a day PRN  fenofibrate Tablet 145 milliGRAM(s) Oral daily  hydrALAZINE Injectable 10 milliGRAM(s) IV Push every 6 hours PRN  losartan 100 milliGRAM(s) Oral daily  magnesium oxide 400 milliGRAM(s) Oral daily  multivitamin/minerals 1 Tablet(s) Oral daily  OXcarbazepine 300 milliGRAM(s) Oral two times a day    SOCIAL HISTORY:  NC    FAMILY HISTORY:  Family history of leukemia (Mother)  Family history of breast cancer (Sibling)  Family history of heart attack (Father)    Vital Signs Last 24 Hrs  T(C): 36.8 (10-12-20 @ 15:00), Max: 37 (10-12-20 @ 11:12)  T(F): 98.3 (10-12-20 @ 15:00), Max: 98.6 (10-12-20 @ 11:12)  HR: 72 (10-12-20 @ 16:00) (58 - 80)  BP: 118/54 (10-12-20 @ 16:00) (118/54 - 179/93)  BP(mean): 72 (10-12-20 @ 16:00) (72 - 100)  RR: 18 (10-12-20 @ 16:00) (7 - 25)  SpO2: 98% (10-12-20 @ 16:00) (96% - 100%)    Conversant, no apparent distress  PERRLA, pink conjunctivae, no ptosis  card s1s2  b/l air entry  soft, ND  no edema    LABS:    10-12    123<L>  |  89<L>  |  7   ----------------------------<  116<H>  4.0   |  25  |  0.65    Ca    8.9      12 Oct 2020 14:30    Urinalysis Basic - ( 12 Oct 2020 11:15 )    Color: Yellow / Appearance: Clear / S.010 / pH: x  Gluc: x / Ketone: Negative  / Bili: Negative / Urobili: Negative   Blood: x / Protein: Negative / Nitrite: Negative   Leuk Esterase: Negative / RBC: x / WBC x   Sq Epi: x / Non Sq Epi: x / Bacteria: x    A/P:    Euvolemic hyponatremia in setting of oxcarbazepine and HCTZ  Avoid HCTZ in future  Would consider alternative for oxcarbazepine  Regular salt diet  FR 1L/day  No need for IVF  BMP, TSH, uric acid in am  Check urine lytes, osms  D/w pt and daughter at bedside    [phone]

## 2020-10-12 NOTE — CONSULT NOTE ADULT - ASSESSMENT
71 yo female with left sided trigeminal neuralgia receiving oxcarbazepine recently with increased dose of 600 mg Q 12 hour presents with hyponatremia 120 improved to 128.    REC:  continue oxcarbazepine 300 mg Q 12 hours stop HCTZ, treat hyponatremia as you are doing.

## 2020-10-12 NOTE — PROGRESS NOTE ADULT - SUBJECTIVE AND OBJECTIVE BOX
Was informed that patient had BMP drawn this AM b/c patient had Na of 128 on preop labs. Was informed that her Na this AM was 120. Asked nurse to redraw a new BMP to confirm result. Went upstairs to ASU to examine patient. Patient awake, alert, following commands. Denies headache, dizziness, vision changes. Pupils equal and reactive to light. Pt states she feel like she normally does. Dr Avendano aware    Neuro: Grossly intact, AAOx3, pupils reactive to light  CV: S1/s2  Pulm: CTA bl     A/P Hyponatremia  - Re-check BMP  - If Na remains low, patient may need hospital admission for monitoring and correction. Will address when repeat Na completed.

## 2020-10-12 NOTE — ASU PATIENT PROFILE, ADULT - PSH
H/O varicose vein ligation  2006 left and 2001 right  History of partial hysterectomy  benign mass, 1998

## 2020-10-12 NOTE — ASU PATIENT PROFILE, ADULT - PMH
DDD (degenerative disc disease), cervical    DDD (degenerative disc disease), lumbar    Hyperlipidemia    Hypertension    Leg swelling    Osteoarthritis    Trigeminal neuralgia    Warthin's tumor  left parotid

## 2020-10-12 NOTE — H&P ADULT - NSHPPHYSICALEXAM_GEN_ALL_CORE
Physical Exam  Gen:  WN/WD Female resting in bed, NAD  ENT:  NC/AT, no JVD noted  Thorax:  Symmetric, no retractions  Lung:  CTA b/l  CV:  S1, S2. RRR  Abd:  Soft, NT/ND.  BS normoactive, no masses to palp  Extrem:  No C/C/E, DP/radial pulses +2  Neuro:  A&O x 3, no gross motor/sensory deficits ICU Vital Signs Last 24 Hrs  T(C): 36.8 (12 Oct 2020 15:00), Max: 37 (12 Oct 2020 11:12)  T(F): 98.3 (12 Oct 2020 15:00), Max: 98.6 (12 Oct 2020 11:12)  HR: 74 (12 Oct 2020 17:00) (58 - 80)  BP: 119/62 (12 Oct 2020 17:00) (118/54 - 179/93)  BP(mean): 80 (12 Oct 2020 17:00) (72 - 100)  ABP: --  ABP(mean): --  RR: 14 (12 Oct 2020 17:00) (7 - 25)  SpO2: 98% (12 Oct 2020 17:00) (96% - 100%)    Physical Exam  Gen:  WN/WD Female resting in bed, NAD  ENT:  NC/AT, no JVD noted  Thorax:  Symmetric, no retractions  Lung:  CTA b/l  CV:  S1, S2. RRR  Abd:  Soft, NT/ND.  BS normoactive, no masses to palp  Extrem:  No C/C/E, DP/radial pulses +2  Neuro:  A&O x 3, no gross motor/sensory deficits

## 2020-10-13 LAB
ALBUMIN SERPL ELPH-MCNC: 3.9 G/DL — SIGNIFICANT CHANGE UP (ref 3.3–5)
ALP SERPL-CCNC: 36 U/L — LOW (ref 40–120)
ALT FLD-CCNC: 38 U/L — SIGNIFICANT CHANGE UP (ref 10–45)
ANION GAP SERPL CALC-SCNC: 7 MMOL/L — SIGNIFICANT CHANGE UP (ref 5–17)
ANION GAP SERPL CALC-SCNC: 8 MMOL/L — SIGNIFICANT CHANGE UP (ref 5–17)
ANION GAP SERPL CALC-SCNC: 9 MMOL/L — SIGNIFICANT CHANGE UP (ref 5–17)
AST SERPL-CCNC: 32 U/L — SIGNIFICANT CHANGE UP (ref 10–40)
BILIRUB SERPL-MCNC: 0.3 MG/DL — SIGNIFICANT CHANGE UP (ref 0.2–1.2)
BUN SERPL-MCNC: 17 MG/DL — SIGNIFICANT CHANGE UP (ref 7–23)
BUN SERPL-MCNC: 18 MG/DL — SIGNIFICANT CHANGE UP (ref 7–23)
BUN SERPL-MCNC: 22 MG/DL — SIGNIFICANT CHANGE UP (ref 7–23)
CALCIUM SERPL-MCNC: 10.1 MG/DL — SIGNIFICANT CHANGE UP (ref 8.4–10.5)
CALCIUM SERPL-MCNC: 9.5 MG/DL — SIGNIFICANT CHANGE UP (ref 8.4–10.5)
CALCIUM SERPL-MCNC: 9.6 MG/DL — SIGNIFICANT CHANGE UP (ref 8.4–10.5)
CHLORIDE SERPL-SCNC: 89 MMOL/L — LOW (ref 96–108)
CHLORIDE SERPL-SCNC: 90 MMOL/L — LOW (ref 96–108)
CHLORIDE SERPL-SCNC: 90 MMOL/L — LOW (ref 96–108)
CO2 SERPL-SCNC: 24 MMOL/L — SIGNIFICANT CHANGE UP (ref 22–31)
CO2 SERPL-SCNC: 25 MMOL/L — SIGNIFICANT CHANGE UP (ref 22–31)
CO2 SERPL-SCNC: 25 MMOL/L — SIGNIFICANT CHANGE UP (ref 22–31)
CORTIS AM PEAK SERPL-MCNC: 19.7 UG/DL — HIGH (ref 6–18.4)
CREAT SERPL-MCNC: 0.81 MG/DL — SIGNIFICANT CHANGE UP (ref 0.5–1.3)
CREAT SERPL-MCNC: 0.9 MG/DL — SIGNIFICANT CHANGE UP (ref 0.5–1.3)
CREAT SERPL-MCNC: 0.93 MG/DL — SIGNIFICANT CHANGE UP (ref 0.5–1.3)
GLUCOSE SERPL-MCNC: 105 MG/DL — HIGH (ref 70–99)
GLUCOSE SERPL-MCNC: 105 MG/DL — HIGH (ref 70–99)
GLUCOSE SERPL-MCNC: 132 MG/DL — HIGH (ref 70–99)
HCT VFR BLD CALC: 32.9 % — LOW (ref 34.5–45)
HCV AB S/CO SERPL IA: 0.1 S/CO — SIGNIFICANT CHANGE UP (ref 0–0.99)
HCV AB SERPL-IMP: SIGNIFICANT CHANGE UP
HGB BLD-MCNC: 11.7 G/DL — SIGNIFICANT CHANGE UP (ref 11.5–15.5)
MAGNESIUM SERPL-MCNC: 2.1 MG/DL — SIGNIFICANT CHANGE UP (ref 1.6–2.6)
MCHC RBC-ENTMCNC: 31.2 PG — SIGNIFICANT CHANGE UP (ref 27–34)
MCHC RBC-ENTMCNC: 35.6 GM/DL — SIGNIFICANT CHANGE UP (ref 32–36)
MCV RBC AUTO: 87.7 FL — SIGNIFICANT CHANGE UP (ref 80–100)
NRBC # BLD: 0 /100 WBCS — SIGNIFICANT CHANGE UP (ref 0–0)
PHOSPHATE SERPL-MCNC: 4.1 MG/DL — SIGNIFICANT CHANGE UP (ref 2.5–4.5)
PLATELET # BLD AUTO: 321 K/UL — SIGNIFICANT CHANGE UP (ref 150–400)
POTASSIUM SERPL-MCNC: 3.9 MMOL/L — SIGNIFICANT CHANGE UP (ref 3.5–5.3)
POTASSIUM SERPL-MCNC: 4 MMOL/L — SIGNIFICANT CHANGE UP (ref 3.5–5.3)
POTASSIUM SERPL-MCNC: 4.1 MMOL/L — SIGNIFICANT CHANGE UP (ref 3.5–5.3)
POTASSIUM SERPL-SCNC: 3.9 MMOL/L — SIGNIFICANT CHANGE UP (ref 3.5–5.3)
POTASSIUM SERPL-SCNC: 4 MMOL/L — SIGNIFICANT CHANGE UP (ref 3.5–5.3)
POTASSIUM SERPL-SCNC: 4.1 MMOL/L — SIGNIFICANT CHANGE UP (ref 3.5–5.3)
PROT SERPL-MCNC: 7.1 G/DL — SIGNIFICANT CHANGE UP (ref 6–8.3)
RBC # BLD: 3.75 M/UL — LOW (ref 3.8–5.2)
RBC # FLD: 11.6 % — SIGNIFICANT CHANGE UP (ref 10.3–14.5)
SARS-COV-2 IGG SERPL QL IA: NEGATIVE — SIGNIFICANT CHANGE UP
SARS-COV-2 IGM SERPL IA-ACNC: 0.01 INDEX — SIGNIFICANT CHANGE UP
SODIUM SERPL-SCNC: 121 MMOL/L — LOW (ref 135–145)
SODIUM SERPL-SCNC: 122 MMOL/L — LOW (ref 135–145)
SODIUM SERPL-SCNC: 124 MMOL/L — LOW (ref 135–145)
T3 SERPL-MCNC: 67 NG/DL — LOW (ref 80–200)
T4 AB SER-ACNC: 4.6 UG/DL — SIGNIFICANT CHANGE UP (ref 4.6–12)
TSH SERPL-MCNC: 1.75 UIU/ML — SIGNIFICANT CHANGE UP (ref 0.27–4.2)
URATE SERPL-MCNC: 2.1 MG/DL — LOW (ref 2.5–7)
URATE SERPL-MCNC: 2.8 MG/DL — SIGNIFICANT CHANGE UP (ref 2.5–7)
WBC # BLD: 7.88 K/UL — SIGNIFICANT CHANGE UP (ref 3.8–10.5)
WBC # FLD AUTO: 7.88 K/UL — SIGNIFICANT CHANGE UP (ref 3.8–10.5)

## 2020-10-13 RX ORDER — TOLVAPTAN 15 MG/1
15 TABLET ORAL ONCE
Refills: 0 | Status: COMPLETED | OUTPATIENT
Start: 2020-10-13 | End: 2020-10-13

## 2020-10-13 RX ADMIN — TOLVAPTAN 15 MILLIGRAM(S): 15 TABLET ORAL at 14:37

## 2020-10-13 RX ADMIN — PREGABALIN 1000 MICROGRAM(S): 225 CAPSULE ORAL at 11:48

## 2020-10-13 RX ADMIN — Medication 1 TABLET(S): at 06:00

## 2020-10-13 RX ADMIN — Medication 1 TABLET(S): at 11:48

## 2020-10-13 RX ADMIN — OXCARBAZEPINE 300 MILLIGRAM(S): 300 TABLET, FILM COATED ORAL at 17:26

## 2020-10-13 RX ADMIN — ATORVASTATIN CALCIUM 80 MILLIGRAM(S): 80 TABLET, FILM COATED ORAL at 22:10

## 2020-10-13 RX ADMIN — OXCARBAZEPINE 300 MILLIGRAM(S): 300 TABLET, FILM COATED ORAL at 06:00

## 2020-10-13 RX ADMIN — Medication 50 MILLIGRAM(S): at 06:46

## 2020-10-13 RX ADMIN — Medication 145 MILLIGRAM(S): at 11:48

## 2020-10-13 RX ADMIN — MAGNESIUM OXIDE 400 MG ORAL TABLET 400 MILLIGRAM(S): 241.3 TABLET ORAL at 11:47

## 2020-10-13 RX ADMIN — Medication 1 TABLET(S): at 17:26

## 2020-10-13 RX ADMIN — LOSARTAN POTASSIUM 100 MILLIGRAM(S): 100 TABLET, FILM COATED ORAL at 06:00

## 2020-10-13 NOTE — PROGRESS NOTE ADULT - SUBJECTIVE AND OBJECTIVE BOX
No distress    Vital Signs Last 24 Hrs  T(C): 36.8 (10-13-20 @ 11:00), Max: 37.2 (10-13-20 @ 07:00)  T(F): 98.2 (10-13-20 @ 11:00), Max: 98.9 (10-13-20 @ 07:00)  HR: 63 (10-13-20 @ 14:00) (56 - 78)  BP: 104/50 (10-13-20 @ 14:00) (92/51 - 148/72)  BP(mean): 68 (10-13-20 @ 14:00) (62 - 103)  RR: 15 (10-13-20 @ :00) (13 - 21)  SpO2: 97% (10-13-20 @ 14:00) (95% - 99%)    Conversant, no apparent distress, AO  card s1s2  b/l air entry  soft, ND  no edema                        11.7   7.88  )-----------( 321      ( 13 Oct 2020 06:00 )             32.9     13 Oct 2020 10:00    124    |  90     |  17     ----------------------------<  132    4.0     |  25     |  0.93     Ca    9.6        13 Oct 2020 10:00  Phos  4.1       13 Oct 2020 06:00  Mg     2.1       13 Oct 2020 06:00    TPro  7.1    /  Alb  3.9    /  TBili  0.3    /  DBili  x      /  AST  32     /  ALT  38     /  AlkPhos  36     13 Oct 2020 06:00    LIVER FUNCTIONS - ( 13 Oct 2020 06:00 )  Alb: 3.9 g/dL / Pro: 7.1 g/dL / ALK PHOS: 36 U/L / ALT: 38 U/L / AST: 32 U/L / GGT: x           Urinalysis Basic - ( 12 Oct 2020 11:15 )    Color: Yellow / Appearance: Clear / S.010 / pH: x  Gluc: x / Ketone: Negative  / Bili: Negative / Urobili: Negative   Blood: x / Protein: Negative / Nitrite: Negative   Leuk Esterase: Negative / RBC: x / WBC x   Sq Epi: x / Non Sq Epi: x / Bacteria: x    atorvastatin 80 milliGRAM(s) Oral at bedtime  calcium carbonate 1250 mG  + Vitamin D (OsCal 500 + D) 1 Tablet(s) Oral two times a day  cyanocobalamin 1000 MICROGram(s) Oral daily  diazepam    Tablet 10 milliGRAM(s) Oral three times a day PRN  fenofibrate Tablet 145 milliGRAM(s) Oral daily  hydrALAZINE Injectable 10 milliGRAM(s) IV Push every 6 hours PRN  losartan 100 milliGRAM(s) Oral daily  magnesium oxide 400 milliGRAM(s) Oral daily  metoprolol succinate ER 50 milliGRAM(s) Oral daily  multivitamin/minerals 1 Tablet(s) Oral daily  OXcarbazepine 300 milliGRAM(s) Oral two times a day    A/P:    Euvolemic hyponatremia in setting of oxcarbazepine and HCTZ  Avoid HCTZ in future  Would consider alternative for oxcarbazepine, neuro f/u  Regular salt diet  Tolvaptan 15mg PO x 1  BMP in am  D/w ICU team    [phone]

## 2020-10-13 NOTE — PROGRESS NOTE ADULT - SUBJECTIVE AND OBJECTIVE BOX
Neurology Progress Note    Subjective & Objective:  Patient is without facial apin on oxcarbazepine 300 mg Q 12 hours.  She was receiving 600 mg Q 12 hours during the last 2 weeks.      Nephrology note seen.    Neuro exam unremarkable.        Lab:  10-13    124<L>  |  90<L>  |  17  ----------------------------<  132<H>  4.0   |  25  |  0.93    Ca    9.6      13 Oct 2020 10:00  Phos  4.1     10-13  Mg     2.1     10-13    TPro  7.1  /  Alb  3.9  /  TBili  0.3  /  DBili  x   /  AST  32  /  ALT  38  /  AlkPhos  36<L>  10-13    LIVER FUNCTIONS - ( 13 Oct 2020 06:00 )  Alb: 3.9 g/dL / Pro: 7.1 g/dL / ALK PHOS: 36 U/L / ALT: 38 U/L / AST: 32 U/L / GGT: x                                   11.7   7.88  )-----------( 321      ( 13 Oct 2020 06:00 )             32.9         Radiology: CT                      MRI  EEG:      MEDICATIONS  (STANDING):  atorvastatin 80 milliGRAM(s) Oral at bedtime  calcium carbonate 1250 mG  + Vitamin D (OsCal 500 + D) 1 Tablet(s) Oral two times a day  cyanocobalamin 1000 MICROGram(s) Oral daily  fenofibrate Tablet 145 milliGRAM(s) Oral daily  losartan 100 milliGRAM(s) Oral daily  magnesium oxide 400 milliGRAM(s) Oral daily  metoprolol succinate ER 50 milliGRAM(s) Oral daily  multivitamin/minerals 1 Tablet(s) Oral daily  OXcarbazepine 300 milliGRAM(s) Oral two times a day    MEDICATIONS  (PRN):  diazepam    Tablet 10 milliGRAM(s) Oral three times a day PRN anxiety

## 2020-10-13 NOTE — DIETITIAN INITIAL EVALUATION ADULT. - ETIOLOGY
related to ? Hyponatremia SIADH- asymptomatic, suspect combination oxcarbamazepine use (as increased dose) and HCTZ use

## 2020-10-13 NOTE — PROGRESS NOTE ADULT - ASSESSMENT
Patient with right sided trigeminal neuralgia responsive to oxcarbazepine with hyponatremia.  She was also receiving hCTZ.    REC:  I do not have a good alternative for the oxcarbazepine.  She could be switched back  to carbamazepine as a trial.  She has received this medication in the past with incomplete control of her facial pain.  Gabapentin could be tried. I'd also consider obtaining  a  neurosurgical opinion and consider options such as gamma knife procedure for pain control.   Will discuss.

## 2020-10-13 NOTE — DIETITIAN INITIAL EVALUATION ADULT. - OTHER INFO
70 year-old-female with PMHx of HTN who was admitted to the critical care unit for hyponatremia. Patient reports consuming a Low Sodium diet PTA due to PMH of HTN. Appetite is reported good no recent weight changes reported . Skin is intact, Last BM (10/12) No edema present , Explained rational for FR , understanding was noted good .

## 2020-10-13 NOTE — PROGRESS NOTE ADULT - SUBJECTIVE AND OBJECTIVE BOX
Follow-up Critical Care Progress Note  Chief Complaint : Hyponatremia, hypo-osmolarity, or hypo-osmolar hyponatremia    Comfortable, not in distress. No dizziness or lightheadedness.     Allergies :Keflex (Rash)      PAST MEDICAL & SURGICAL HISTORY:  Warthin&#x27;s tumor  left parotid    Leg swelling    Hypertension    DDD (degenerative disc disease), cervical    DDD (degenerative disc disease), lumbar    Osteoarthritis    Hyperlipidemia    Trigeminal neuralgia    History of partial hysterectomy  benign mass,     H/O varicose vein ligation   left and  right        Medications:  MEDICATIONS  (STANDING):  atorvastatin 80 milliGRAM(s) Oral at bedtime  calcium carbonate 1250 mG  + Vitamin D (OsCal 500 + D) 1 Tablet(s) Oral two times a day  cyanocobalamin 1000 MICROGram(s) Oral daily  fenofibrate Tablet 145 milliGRAM(s) Oral daily  losartan 100 milliGRAM(s) Oral daily  magnesium oxide 400 milliGRAM(s) Oral daily  metoprolol succinate ER 50 milliGRAM(s) Oral daily  multivitamin/minerals 1 Tablet(s) Oral daily  OXcarbazepine 300 milliGRAM(s) Oral two times a day  tolvaptan 15 milliGRAM(s) Oral once    MEDICATIONS  (PRN):  diazepam    Tablet 10 milliGRAM(s) Oral three times a day PRN anxiety  hydrALAZINE Injectable 10 milliGRAM(s) IV Push every 6 hours PRN SBP > 140      LABS:                        11.7   7.88  )-----------( 321      ( 13 Oct 2020 06:00 )             32.9     10-13    124<L>  |  90<L>  |  17  ----------------------------<  132<H>  4.0   |  25  |  0.93    Ca    9.6      13 Oct 2020 10:00  Phos  4.1     10-13  Mg     2.1     10-13    TPro  7.1  /  Alb  3.9  /  TBili  0.3  /  DBili  x   /  AST  32  /  ALT  38  /  AlkPhos  36<L>  10-13              Urinalysis Basic - ( 12 Oct 2020 11:15 )    Color: Yellow / Appearance: Clear / S.010 / pH: x  Gluc: x / Ketone: Negative  / Bili: Negative / Urobili: Negative   Blood: x / Protein: Negative / Nitrite: Negative   Leuk Esterase: Negative / RBC: x / WBC x   Sq Epi: x / Non Sq Epi: x / Bacteria: x              CULTURES: (if applicable)          CAPILLARY BLOOD GLUCOSE          RADIOLOGY  CXR:      CT:    ECHO:      VITALS:  T(C): 36.8 (10-13-20 @ 11:00), Max: 37.2 (10-13-20 @ 07:00)  T(F): 98.2 (10-13-20 @ 11:00), Max: 98.9 (10-13-20 @ 07:00)  HR: 57 (10-13-20 @ 12:00) (56 - 80)  BP: 103/53 (10-13-20 @ 12:00) (92/51 - 167/73)  BP(mean): 69 (10-13-20 @ 12:00) (62 - 103)  ABP: --  ABP(mean): --  RR: 18 (10-13-20 @ 12:00) (7 - 25)  SpO2: 96% (10-13-20 @ 12:00) (95% - 100%)  CVP(mm Hg): --  CVP(cm H2O): --    Ins and Outs     10-12-20 @ 07:01  -  10-13-20 @ 07:00  --------------------------------------------------------  IN: 610 mL / OUT: 403 mL / NET: 207 mL    10-13-20 @ 07:01  -  10-13-20 @ 12:14  --------------------------------------------------------  IN: 0 mL / OUT: 500 mL / NET: -500 mL        Height (cm): 160 (10-12-20 @ 12:00)  Weight (kg): 98.1 (10-12-20 @ 12:00)  BMI (kg/m2): 38.3 (10-12-20 @ 12:00)        I&O's Detail    12 Oct 2020 07:01  -  13 Oct 2020 07:00  --------------------------------------------------------  IN:    Lactated Ringers: 250 mL    Oral Fluid: 360 mL  Total IN: 610 mL    OUT:    Voided (mL): 403 mL  Total OUT: 403 mL    Total NET: 207 mL      13 Oct 2020 07:01  -  13 Oct 2020 12:14  --------------------------------------------------------  IN:  Total IN: 0 mL    OUT:    Voided (mL): 500 mL  Total OUT: 500 mL    Total NET: -500 mL

## 2020-10-13 NOTE — PROGRESS NOTE ADULT - ASSESSMENT
Physical Exam  Gen: Comfortable, no acute distress  ENT:  NC/AT, no JVD noted  Thorax:  Symmetric, no retractions  Lung:  CTA b/l, no wheezing or ronchi  CV:  S1, S2. RRR  Abd:  Soft, NT/ND.  BS normoactive, no masses to palp  Extrem:  No edema, DP/radial pulses +2  Neuro:  A&O x 3, no gross motor/sensory deficits    · Assessment	  70 year-old-female with PMHx of HTN who was admitted to the critical care unit for hyponatremia.  1. Normovolemic  Hyponatremia SIADH- asymptomatic, suspect combination oxcarbamazepine use (as increased dose) and HCTZ use  2. Parotid gland tumor   3. Hypertension  4. Trigeminal neuralgia    - Fluid restriction  - trend sodium  - Tolvaptan per nephrology   -Continue anti-hypertensive meds but hold HCTZ  -Neuro eval appreciated, sodium improving slowly so will continue trileptal   -Check Thyroid, cortisol uric acid  -Monitor BP and vital signs  -case d/w patient at bedside  -Oral diet  - DVT prophylaxis  - OOB to chair

## 2020-10-14 LAB
ANION GAP SERPL CALC-SCNC: 10 MMOL/L — SIGNIFICANT CHANGE UP (ref 5–17)
BUN SERPL-MCNC: 17 MG/DL — SIGNIFICANT CHANGE UP (ref 7–23)
CALCIUM SERPL-MCNC: 10.5 MG/DL — SIGNIFICANT CHANGE UP (ref 8.4–10.5)
CHLORIDE SERPL-SCNC: 100 MMOL/L — SIGNIFICANT CHANGE UP (ref 96–108)
CO2 SERPL-SCNC: 24 MMOL/L — SIGNIFICANT CHANGE UP (ref 22–31)
CREAT SERPL-MCNC: 0.89 MG/DL — SIGNIFICANT CHANGE UP (ref 0.5–1.3)
GLUCOSE SERPL-MCNC: 112 MG/DL — HIGH (ref 70–99)
MAGNESIUM SERPL-MCNC: 2.2 MG/DL — SIGNIFICANT CHANGE UP (ref 1.6–2.6)
POTASSIUM SERPL-MCNC: 4.4 MMOL/L — SIGNIFICANT CHANGE UP (ref 3.5–5.3)
POTASSIUM SERPL-SCNC: 4.4 MMOL/L — SIGNIFICANT CHANGE UP (ref 3.5–5.3)
SODIUM SERPL-SCNC: 134 MMOL/L — LOW (ref 135–145)

## 2020-10-14 RX ORDER — METOPROLOL TARTRATE 50 MG
25 TABLET ORAL DAILY
Refills: 0 | Status: DISCONTINUED | OUTPATIENT
Start: 2020-10-14 | End: 2020-10-15

## 2020-10-14 RX ADMIN — OXCARBAZEPINE 300 MILLIGRAM(S): 300 TABLET, FILM COATED ORAL at 06:35

## 2020-10-14 RX ADMIN — Medication 1 TABLET(S): at 12:15

## 2020-10-14 RX ADMIN — OXCARBAZEPINE 300 MILLIGRAM(S): 300 TABLET, FILM COATED ORAL at 17:58

## 2020-10-14 RX ADMIN — Medication 145 MILLIGRAM(S): at 12:14

## 2020-10-14 RX ADMIN — PREGABALIN 1000 MICROGRAM(S): 225 CAPSULE ORAL at 12:14

## 2020-10-14 RX ADMIN — ATORVASTATIN CALCIUM 80 MILLIGRAM(S): 80 TABLET, FILM COATED ORAL at 21:36

## 2020-10-14 RX ADMIN — MAGNESIUM OXIDE 400 MG ORAL TABLET 400 MILLIGRAM(S): 241.3 TABLET ORAL at 12:14

## 2020-10-14 RX ADMIN — Medication 25 MILLIGRAM(S): at 17:57

## 2020-10-14 RX ADMIN — Medication 1 TABLET(S): at 06:35

## 2020-10-14 NOTE — PROGRESS NOTE ADULT - SUBJECTIVE AND OBJECTIVE BOX
Follow-up Critical Care Progress Note  Chief Complaint : Hyponatremia, hypo-osmolarity, or hypo-osmolar hyponatremia    Awake and alert this morning. States felt dizzy getting out of bed today. Denies any chest pain or headache.     Allergies :Keflex (Rash)      PAST MEDICAL & SURGICAL HISTORY:  Warthin&#x27;s tumor  left parotid    Leg swelling    Hypertension    DDD (degenerative disc disease), cervical    DDD (degenerative disc disease), lumbar    Osteoarthritis    Hyperlipidemia    Trigeminal neuralgia    History of partial hysterectomy  benign mass,     H/O varicose vein ligation   left and  right        Medications:  MEDICATIONS  (STANDING):  atorvastatin 80 milliGRAM(s) Oral at bedtime  calcium carbonate 1250 mG  + Vitamin D (OsCal 500 + D) 1 Tablet(s) Oral two times a day  cyanocobalamin 1000 MICROGram(s) Oral daily  fenofibrate Tablet 145 milliGRAM(s) Oral daily  losartan 100 milliGRAM(s) Oral daily  magnesium oxide 400 milliGRAM(s) Oral daily  metoprolol succinate ER 50 milliGRAM(s) Oral daily  multivitamin/minerals 1 Tablet(s) Oral daily  OXcarbazepine 300 milliGRAM(s) Oral two times a day    MEDICATIONS  (PRN):  diazepam    Tablet 10 milliGRAM(s) Oral three times a day PRN anxiety      LABS:                        11.7   7.88  )-----------( 321      ( 13 Oct 2020 06:00 )             32.9     10-14    134<L>  |  100  |  17  ----------------------------<  112<H>  4.4   |  24  |  0.89    Ca    10.5      14 Oct 2020 05:21  Phos  4.1     10-13  Mg     2.2     10-14    TPro  7.1  /  Alb  3.9  /  TBili  0.3  /  DBili  x   /  AST  32  /  ALT  38  /  AlkPhos  36<L>  10-13              Urinalysis Basic - ( 12 Oct 2020 11:15 )    Color: Yellow / Appearance: Clear / S.010 / pH: x  Gluc: x / Ketone: Negative  / Bili: Negative / Urobili: Negative   Blood: x / Protein: Negative / Nitrite: Negative   Leuk Esterase: Negative / RBC: x / WBC x   Sq Epi: x / Non Sq Epi: x / Bacteria: x              CULTURES: (if applicable)          CAPILLARY BLOOD GLUCOSE          RADIOLOGY  CXR:      CT:    ECHO:      VITALS:  T(C): 36.6 (10-14-20 @ 08:00), Max: 36.9 (10-13-20 @ 15:00)  T(F): 97.8 (10-14-20 @ 08:00), Max: 98.4 (10-13-20 @ 15:00)  HR: 75 (10-14-20 @ 08:00) (57 - 81)  BP: 116/68 (10-14-20 @ 08:00) (67/55 - 144/65)  BP(mean): 81 (10-14-20 @ 08:00) (60 - 98)  ABP: --  ABP(mean): --  RR: 15 (10-14-20 @ 08:00) (12 - 24)  SpO2: 95% (10-14-20 @ 08:00) (93% - 99%)  CVP(mm Hg): --  CVP(cm H2O): --    Ins and Outs     10-13-20 @ 07:01  -  10-14-20 @ 07:00  --------------------------------------------------------  IN: 500 mL / OUT: 1050 mL / NET: -550 mL        Height (cm): 160 (10-12-20 @ 12:00)  Weight (kg): 98.1 (10-12-20 @ 12:00)  BMI (kg/m2): 38.3 (10-12-20 @ 12:00)        I&O's Detail    13 Oct 2020 07:01  -  14 Oct 2020 07:00  --------------------------------------------------------  IN:    Oral Fluid: 500 mL  Total IN: 500 mL    OUT:    Voided (mL): 1050 mL  Total OUT: 1050 mL    Total NET: -550 mL

## 2020-10-14 NOTE — PROGRESS NOTE ADULT - SUBJECTIVE AND OBJECTIVE BOX
No distress    Vital Signs Last 24 Hrs  T(C): 36.6 (10-14-20 @ 15:49), Max: 36.7 (10-14-20 @ 06:00)  T(F): 97.8 (10-14-20 @ 15:49), Max: 98.1 (10-14-20 @ 06:00)  HR: 76 (10-14-20 @ 15:49) (65 - 85)  BP: 173/94 (10-14-20 @ 15:49) (96/44 - 173/94)  BP(mean): 87 (10-14-20 @ 12:00) (60 - 91)  RR: 16 (10-14-20 @ 15:49) (13 - 24)  SpO2: 97% (10-14-20 @ 15:49) (93% - 97%)    Conversant, no apparent distress, AO  card s1s2  b/l air entry  soft, ND  no edema                                11.7   7.88  )-----------( 321      ( 13 Oct 2020 06:00 )             32.9     14 Oct 2020 05:21    134    |  100    |  17     ----------------------------<  112    4.4     |  24     |  0.89     Ca    10.5       14 Oct 2020 05:21  Phos  4.1       13 Oct 2020 06:00  Mg     2.2       14 Oct 2020 05:21    TPro  7.1    /  Alb  3.9    /  TBili  0.3    /  DBili  x      /  AST  32     /  ALT  38     /  AlkPhos  36     13 Oct 2020 06:00    LIVER FUNCTIONS - ( 13 Oct 2020 06:00 )  Alb: 3.9 g/dL / Pro: 7.1 g/dL / ALK PHOS: 36 U/L / ALT: 38 U/L / AST: 32 U/L / GGT: x           atorvastatin 80 milliGRAM(s) Oral at bedtime  calcium carbonate 1250 mG  + Vitamin D (OsCal 500 + D) 1 Tablet(s) Oral two times a day  cyanocobalamin 1000 MICROGram(s) Oral daily  diazepam    Tablet 10 milliGRAM(s) Oral three times a day PRN  fenofibrate Tablet 145 milliGRAM(s) Oral daily  magnesium oxide 400 milliGRAM(s) Oral daily  metoprolol succinate ER 25 milliGRAM(s) Oral daily  multivitamin/minerals 1 Tablet(s) Oral daily  OXcarbazepine 300 milliGRAM(s) Oral two times a day    A/P:    Euvolemic hyponatremia in setting of oxcarbazepine and HCTZ  Avoid HCTZ in future  Would consider alternative for oxcarbazepine, if possible   Na is better after Tolvaptan  BMP in am    [phone]

## 2020-10-14 NOTE — PROVIDER CONTACT NOTE (MEDICATION) - SITUATION
patients systolic blood pressure 102 and heartrate 65. PA instructed to hold medications. Change in orders to come.

## 2020-10-14 NOTE — PROGRESS NOTE ADULT - ASSESSMENT
Physical Exam  Gen: Comfortable, no acute distress  ENT:  NC/AT, no JVD noted  Thorax:  Symmetric, no retractions  Lung:  CTA b/l, no wheezing or ronchi  CV:  S1, S2. RRR  Abd:  Soft, NT/ND.  BS normoactive, no masses to palp  Extrem:  No edema, DP/radial pulses +2  Neuro:  A&O x 3, no gross motor/sensory deficits    · Assessment	  70 year-old-female with PMHx of HTN who was admitted to the critical care unit for hyponatremia.  1. Normovolemic  Hyponatremia SIADH - asymptomatic, suspect combination oxcarbamazepine use (as increased dose) and HCTZ use  2. Parotid gland tumor   3. Hypertension  4. Trigeminal neuralgia    Plan  - trend sodium  - Tolvaptan per nephrology, will liberalize fluid as s/p tolvaptan  -Continue anti-hypertensive meds but hold HCTZ  -Neuro eval appreciated, no good alternative to trileptal  -Monitor BP and vital signs  -Check orthostatic vitals  -case d/w patient at bedside  - F/u surgery regarding plan  -Oral diet  - DVT prophylaxis  - OOB to chair  - Dispo planning discharge vs. transfer out of ICU today

## 2020-10-15 ENCOUNTER — TRANSCRIPTION ENCOUNTER (OUTPATIENT)
Age: 70
End: 2020-10-15

## 2020-10-15 VITALS
OXYGEN SATURATION: 97 % | SYSTOLIC BLOOD PRESSURE: 142 MMHG | DIASTOLIC BLOOD PRESSURE: 73 MMHG | RESPIRATION RATE: 15 BRPM | HEART RATE: 77 BPM | WEIGHT: 235.89 LBS | TEMPERATURE: 98 F

## 2020-10-15 LAB
ANION GAP SERPL CALC-SCNC: 11 MMOL/L — SIGNIFICANT CHANGE UP (ref 5–17)
BUN SERPL-MCNC: 23 MG/DL — SIGNIFICANT CHANGE UP (ref 7–23)
CALCIUM SERPL-MCNC: 10.3 MG/DL — SIGNIFICANT CHANGE UP (ref 8.4–10.5)
CHLORIDE SERPL-SCNC: 101 MMOL/L — SIGNIFICANT CHANGE UP (ref 96–108)
CO2 SERPL-SCNC: 26 MMOL/L — SIGNIFICANT CHANGE UP (ref 22–31)
CREAT SERPL-MCNC: 1.1 MG/DL — SIGNIFICANT CHANGE UP (ref 0.5–1.3)
GLUCOSE SERPL-MCNC: 123 MG/DL — HIGH (ref 70–99)
MAGNESIUM SERPL-MCNC: 2.3 MG/DL — SIGNIFICANT CHANGE UP (ref 1.6–2.6)
POTASSIUM SERPL-MCNC: 5.1 MMOL/L — SIGNIFICANT CHANGE UP (ref 3.5–5.3)
POTASSIUM SERPL-SCNC: 5.1 MMOL/L — SIGNIFICANT CHANGE UP (ref 3.5–5.3)
SODIUM SERPL-SCNC: 138 MMOL/L — SIGNIFICANT CHANGE UP (ref 135–145)

## 2020-10-15 PROCEDURE — 99239 HOSP IP/OBS DSCHRG MGMT >30: CPT

## 2020-10-15 RX ORDER — ACETAMINOPHEN 500 MG
650 TABLET ORAL EVERY 6 HOURS
Refills: 0 | Status: DISCONTINUED | OUTPATIENT
Start: 2020-10-15 | End: 2020-10-15

## 2020-10-15 RX ORDER — MAGNESIUM OXIDE 400 MG ORAL TABLET 241.3 MG
1 TABLET ORAL
Qty: 0 | Refills: 0 | DISCHARGE
Start: 2020-10-15

## 2020-10-15 RX ORDER — OXCARBAZEPINE 300 MG/1
1 TABLET, FILM COATED ORAL
Qty: 0 | Refills: 0 | DISCHARGE
Start: 2020-10-15

## 2020-10-15 RX ADMIN — PREGABALIN 1000 MICROGRAM(S): 225 CAPSULE ORAL at 12:10

## 2020-10-15 RX ADMIN — OXCARBAZEPINE 300 MILLIGRAM(S): 300 TABLET, FILM COATED ORAL at 06:14

## 2020-10-15 RX ADMIN — Medication 650 MILLIGRAM(S): at 12:22

## 2020-10-15 RX ADMIN — Medication 1 TABLET(S): at 12:27

## 2020-10-15 RX ADMIN — Medication 145 MILLIGRAM(S): at 12:08

## 2020-10-15 RX ADMIN — MAGNESIUM OXIDE 400 MG ORAL TABLET 400 MILLIGRAM(S): 241.3 TABLET ORAL at 12:08

## 2020-10-15 RX ADMIN — Medication 650 MILLIGRAM(S): at 13:13

## 2020-10-15 RX ADMIN — Medication 25 MILLIGRAM(S): at 06:14

## 2020-10-15 NOTE — DISCHARGE NOTE PROVIDER - CARE PROVIDER_API CALL
Ashutosh Macile  GERIATRIC MEDICINE  [address] [place] [address]  Phone: [phone]  Fax: [phone]  Follow Up Time: 1 week    Shekhar Patiño  NEUROLOGY  [address] [place] [id number]  Phone: [phone]  Fax: [phone]  Follow Up Time: 1 week    Linwood Montilla  NEUROSURGERY  [address] [place] [address]  Phone: [phone]  Fax: [phone]  Follow Up Time: 2 weeks

## 2020-10-15 NOTE — DISCHARGE NOTE PROVIDER - NSDCMRMEDTOKEN_GEN_ALL_CORE_FT
alendronate 35 mg oral tablet: 1 tab(s) orally once a week Saturday  Calcium 600+D oral tablet: 1 tab(s) orally 2 times a day  Centrum Silver Women&#x27;s oral tablet: 1 tab(s) orally once a day  diazePAM 10 mg oral tablet: 1 tab(s) orally 3 times a day, As Needed  fenofibrate 145 mg oral tablet: 1 tab(s) orally once a day  hydroCHLOROthiazide 25 mg oral tablet: 1 tab(s) orally once a day  irbesartan 300 mg oral tablet: 1 tab(s) orally once a day (in the evening)  magnesium: 400 milligram(s) orally once a day  metoprolol succinate 50 mg oral tablet, extended release: 1 tab(s) orally once a day  Osteo Bi-Flex 250 mg-200 mg oral tablet: 1 tab(s) orally once a day  Osteo Bi-Flex Advanced oral tablet: 1 tab(s) orally 2 times a day  OXcarbazepine 300 mg oral tablet: 1 tab(s) orally 3 times a day  rosuvastatin 20 mg oral tablet: 1 tab(s) orally once a day  Vitamin B12 1000 mcg oral tablet: 2 tab(s) orally once a day   alendronate 35 mg oral tablet: 1 tab(s) orally once a week Saturday  Calcium 600+D oral tablet: 1 tab(s) orally 2 times a day  Centrum Silver Women&#x27;s oral tablet: 1 tab(s) orally once a day  diazePAM 10 mg oral tablet: 1 tab(s) orally 3 times a day, As Needed  fenofibrate 145 mg oral tablet: 1 tab(s) orally once a day  irbesartan 300 mg oral tablet: 1 tab(s) orally once a day (in the evening)  magnesium: 400 milligram(s) orally once a day  magnesium oxide 400 mg (241.3 mg elemental magnesium) oral tablet: 1 tab(s) orally once a day  metoprolol succinate 50 mg oral tablet, extended release: 1 tab(s) orally once a day  Osteo Bi-Flex 250 mg-200 mg oral tablet: 1 tab(s) orally once a day  Osteo Bi-Flex Advanced oral tablet: 1 tab(s) orally 2 times a day  OXcarbazepine 300 mg oral tablet: 1 tab(s) orally 2 times a day  rosuvastatin 20 mg oral tablet: 1 tab(s) orally once a day  Vitamin B12 1000 mcg oral tablet: 2 tab(s) orally once a day

## 2020-10-15 NOTE — DISCHARGE NOTE NURSING/CASE MANAGEMENT/SOCIAL WORK - PATIENT PORTAL LINK FT
You can access the FollowMyHealth Patient Portal offered by [facility] by registering at the following website: [email or web address]. By joining [employer]’s FollowMyHealth portal, you will also be able to view your health information using other applications (apps) compatible with our system.

## 2020-10-15 NOTE — PROGRESS NOTE ADULT - ASSESSMENT
Physical Exam  Gen: Comfortable, no acute distress  ENT:  NC/AT, no JVD noted  Thorax:  Symmetric, no retractions  Lung:  CTA b/l, no wheezing or ronchi  CV:  S1, S2. RRR  Abd:  Soft, NT/ND.  BS normoactive, no masses to palp  Neuro:  A&O x 3, no gross motor/sensory deficits    Assessment  70 year-old-female with PMHx of HTN who was admitted to the critical care unit for hyponatremia.  1. Normovolemic  Hyponatremia SIADH - asymptomatic, suspect combination oxcarbazepine use (as increased dose) and HCTZ use  2. Parotid gland tumor   3. Hypertension  4. Trigeminal neuralgia    Plan  - trend sodium  - hyponatremia management as per Renal   -Continue anti-hypertensive meds but hold HCTZ  -Neuro eval appreciated, no good alternative to trileptal  -Check orthostatic vitals  -case d/w patient at bedside  - F/u surgery regarding plan  -Oral diet  - DVT prophylaxis  - OOB to chair  - continue care on medical floor, no active ccm issues reconsult as needed.   - Case d/w Dr. Schulz

## 2020-10-15 NOTE — DISCHARGE NOTE PROVIDER - NSDCFUADDAPPT_GEN_ALL_CORE_FT
Please call Dr. Tejinder aMciel office [phone] to make a followup appointment, his office will reopen on Monday 10/19/2020.

## 2020-10-15 NOTE — DISCHARGE NOTE PROVIDER - HOSPITAL COURSE
This is a 70 year-old-female with PMHx of HTN who was admitted to the critical care unit for hyponatremia. Patient was originally presented to the ambulatory surgery unit today for left parotid gland tumor removal. Presurgical testing labs showed sodium of 120. Pt was admitted to ICU. S/p Tolvaptan X1 and HCTZ was held. Her trileptal dose was decreased back to previous dose as there was no good alternative per neuro.   Pt to f/u with Dr. Patiño(neuro) as outpt and Dr. Le(ENT) as outpt.   Pt to f/u with PCP(Dr. Ashutosh Maciel) as outpt.

## 2020-10-15 NOTE — DISCHARGE NOTE PROVIDER - CARE PROVIDERS DIRECT ADDRESSES
,DirectAddress_Unknown,[email or web address]

## 2020-10-15 NOTE — DISCHARGE NOTE NURSING/CASE MANAGEMENT/SOCIAL WORK - NSDCFUADDAPPT_GEN_ALL_CORE_FT
Please call Dr. Tejinder Maciel office [phone] to make a followup appointment, his office will reopen on Monday 10/19/2020.

## 2020-10-15 NOTE — PHYSICAL THERAPY INITIAL EVALUATION ADULT - PERTINENT HX OF CURRENT PROBLEM, REHAB EVAL
This is a 70 year-old-female with PMHx of HTN who was admitted to the critical care unit for hyponatremia. Patient was originally presented to the ambulatory surgery unit today for left parotid gland tumor removal.

## 2020-10-15 NOTE — PROGRESS NOTE ADULT - SUBJECTIVE AND OBJECTIVE BOX
No distress    Vital Signs Last 24 Hrs  T(C): 36.8 (10-15-20 @ 05:29), Max: 36.8 (10-14-20 @ 21:28)  T(F): 98.3 (10-15-20 @ 05:29), Max: 98.3 (10-14-20 @ 21:28)  HR: 77 (10-15-20 @ 05:29) (77 - 78)  BP: 142/73 (10-15-20 @ 05:29) (142/73 - 150/79)  RR: 15 (10-15-20 @ 05:29) (15 - 16)  SpO2: 97% (10-15-20 @ 05:29) (96% - 97%)    Conversant, no apparent distress, AO  card s1s2  b/l air entry  soft, ND  no edema                  15 Oct 2020 05:30    138    |  101    |  23     ----------------------------<  123    5.1     |  26     |  1.10     Ca    10.3       15 Oct 2020 05:30  Mg     2.3       15 Oct 2020 05:30    acetaminophen Tablet 650 milliGRAM(s) Oral every 6 hours PRN  atorvastatin 80 milliGRAM(s) Oral at bedtime  calcium carbonate 1250 mG  + Vitamin D (OsCal 500 + D) 1 Tablet(s) Oral two times a day  cyanocobalamin 1000 MICROGram(s) Oral daily  diazepam    Tablet 10 milliGRAM(s) Oral three times a day PRN  fenofibrate Tablet 145 milliGRAM(s) Oral daily  magnesium oxide 400 milliGRAM(s) Oral daily  metoprolol succinate ER 25 milliGRAM(s) Oral daily  multivitamin 1 Tablet(s) Oral daily  OXcarbazepine 300 milliGRAM(s) Oral two times a day    A/P:    Euvolemic hyponatremia in setting of oxcarbazepine and HCTZ  Resolved  Avoid HCTZ in future  Neuro f/u appr   Continue lower dose oxcarbazepine w/op f/u  No objection to d/c from renal pov    [phone]

## 2020-10-15 NOTE — DISCHARGE NOTE PROVIDER - PROVIDER TOKENS
PROVIDER:[TOKEN:[[id number]],FOLLOWUP:[1 week]],PROVIDER:[TOKEN:[[id number]],FOLLOWUP:[1 week]],PROVIDER:[TOKEN:[[id number]],FOLLOWUP:[2 weeks]]

## 2020-10-15 NOTE — PHYSICAL THERAPY INITIAL EVALUATION ADULT - ADDITIONAL COMMENTS
pt lives in apartment w/elevator w/spouse, uses straight cane to ambulate, also has a quad cane. pts  assists pt w/some ADL's, dtr lives nearby and is involved in pts care

## 2020-10-15 NOTE — PROGRESS NOTE ADULT - SUBJECTIVE AND OBJECTIVE BOX
Follow-up Critical Care Progress Note  Chief Complaint : Hyponatremia, hypo-osmolarity, or hypo-osmolar hyponatremia        patient seen and examined  comfortable  no complaints        Allergies :Keflex (Rash)      PAST MEDICAL & SURGICAL HISTORY:  Warthin&#x27;s tumor  left parotid    Leg swelling    Hypertension    DDD (degenerative disc disease), cervical    DDD (degenerative disc disease), lumbar    Osteoarthritis    Hyperlipidemia    Trigeminal neuralgia    History of partial hysterectomy  benign mass, 1998    H/O varicose vein ligation  2006 left and 2001 right        Medications:  MEDICATIONS  (STANDING):  atorvastatin 80 milliGRAM(s) Oral at bedtime  calcium carbonate 1250 mG  + Vitamin D (OsCal 500 + D) 1 Tablet(s) Oral two times a day  cyanocobalamin 1000 MICROGram(s) Oral daily  fenofibrate Tablet 145 milliGRAM(s) Oral daily  magnesium oxide 400 milliGRAM(s) Oral daily  metoprolol succinate ER 25 milliGRAM(s) Oral daily  multivitamin/minerals 1 Tablet(s) Oral daily  OXcarbazepine 300 milliGRAM(s) Oral two times a day    MEDICATIONS  (PRN):  diazepam    Tablet 10 milliGRAM(s) Oral three times a day PRN anxiety    Trend Sodium  10-15-20 @ 05:30   -  138  10-14-20 @ 05:21   -  134<L>  10-13-20 @ 10:00   -  124<L>  10-13-20 @ 06:00   -  122<L>  10-13-20 @ 02:14   -  121<L>  10-12-20 @ 20:45   -  122<L>      LABS:    10-15    138  |  101  |  23  ----------------------------<  123<H>  5.1   |  26  |  1.10    Ca    10.3      15 Oct 2020 05:30  Mg     2.3     10-15          VITALS:  T(C): 36.8 (10-15-20 @ 05:29), Max: 36.8 (10-14-20 @ 21:28)  T(F): 98.3 (10-15-20 @ 05:29), Max: 98.3 (10-14-20 @ 21:28)  HR: 77 (10-15-20 @ 05:29) (76 - 85)  BP: 142/73 (10-15-20 @ 05:29) (138/69 - 173/94)  BP(mean): 87 (10-14-20 @ 12:00) (87 - 87)  ABP: --  ABP(mean): --  RR: 15 (10-15-20 @ 05:29) (15 - 16)  SpO2: 97% (10-15-20 @ 05:29) (96% - 97%)  CVP(mm Hg): --  CVP(cm H2O): --    Ins and Outs     10-14-20 @ 07:01  -  10-15-20 @ 07:00  --------------------------------------------------------  IN: 960 mL / OUT: 0 mL / NET: 960 mL    10-15-20 @ 07:01  -  10-15-20 @ 11:54  --------------------------------------------------------  IN: 820 mL / OUT: 0 mL / NET: 820 mL        Height (cm): 165.1 (10-14-20 @ 15:49)  Weight (kg): 98.1 (10-12-20 @ 12:00)  BMI (kg/m2): 36 (10-14-20 @ 15:49)        I&O's Detail    14 Oct 2020 07:01  -  15 Oct 2020 07:00  --------------------------------------------------------  IN:    Oral Fluid: 960 mL  Total IN: 960 mL    OUT:  Total OUT: 0 mL    Total NET: 960 mL      15 Oct 2020 07:01  -  15 Oct 2020 11:54  --------------------------------------------------------  IN:    Oral Fluid: 820 mL  Total IN: 820 mL    OUT:  Total OUT: 0 mL    Total NET: 820 mL

## 2020-10-26 RX ORDER — DULOXETINE HYDROCHLORIDE 30 MG/1
30 CAPSULE, DELAYED RELEASE PELLETS ORAL
Qty: 30 | Refills: 0 | Status: ACTIVE | COMMUNITY
Start: 2020-10-23 | End: 1900-01-01

## 2020-10-29 PROCEDURE — 36415 COLL VENOUS BLD VENIPUNCTURE: CPT

## 2020-10-29 PROCEDURE — 84443 ASSAY THYROID STIM HORMONE: CPT

## 2020-10-29 PROCEDURE — 84300 ASSAY OF URINE SODIUM: CPT

## 2020-10-29 PROCEDURE — 80053 COMPREHEN METABOLIC PANEL: CPT

## 2020-10-29 PROCEDURE — 84100 ASSAY OF PHOSPHORUS: CPT

## 2020-10-29 PROCEDURE — 84480 ASSAY TRIIODOTHYRONINE (T3): CPT

## 2020-10-29 PROCEDURE — 82570 ASSAY OF URINE CREATININE: CPT

## 2020-10-29 PROCEDURE — 84560 ASSAY OF URINE/URIC ACID: CPT

## 2020-10-29 PROCEDURE — 86803 HEPATITIS C AB TEST: CPT

## 2020-10-29 PROCEDURE — 86769 SARS-COV-2 COVID-19 ANTIBODY: CPT

## 2020-10-29 PROCEDURE — 84436 ASSAY OF TOTAL THYROXINE: CPT

## 2020-10-29 PROCEDURE — 80048 BASIC METABOLIC PNL TOTAL CA: CPT

## 2020-10-29 PROCEDURE — 83935 ASSAY OF URINE OSMOLALITY: CPT

## 2020-10-29 PROCEDURE — 82436 ASSAY OF URINE CHLORIDE: CPT

## 2020-10-29 PROCEDURE — 84156 ASSAY OF PROTEIN URINE: CPT

## 2020-10-29 PROCEDURE — 84550 ASSAY OF BLOOD/URIC ACID: CPT

## 2020-10-29 PROCEDURE — 85027 COMPLETE CBC AUTOMATED: CPT

## 2020-10-29 PROCEDURE — 83735 ASSAY OF MAGNESIUM: CPT

## 2020-10-29 PROCEDURE — 82533 TOTAL CORTISOL: CPT

## 2020-12-15 ENCOUNTER — OUTPATIENT (OUTPATIENT)
Dept: OUTPATIENT SERVICES | Facility: HOSPITAL | Age: 70
LOS: 1 days | End: 2020-12-15
Payer: COMMERCIAL

## 2020-12-15 VITALS
SYSTOLIC BLOOD PRESSURE: 152 MMHG | HEIGHT: 63 IN | RESPIRATION RATE: 16 BRPM | DIASTOLIC BLOOD PRESSURE: 88 MMHG | TEMPERATURE: 99 F | HEART RATE: 70 BPM | OXYGEN SATURATION: 97 % | WEIGHT: 219.58 LBS

## 2020-12-15 DIAGNOSIS — K11.8 OTHER DISEASES OF SALIVARY GLANDS: ICD-10-CM

## 2020-12-15 DIAGNOSIS — E78.5 HYPERLIPIDEMIA, UNSPECIFIED: ICD-10-CM

## 2020-12-15 DIAGNOSIS — G50.0 TRIGEMINAL NEURALGIA: ICD-10-CM

## 2020-12-15 DIAGNOSIS — I10 ESSENTIAL (PRIMARY) HYPERTENSION: ICD-10-CM

## 2020-12-15 DIAGNOSIS — Z90.711 ACQUIRED ABSENCE OF UTERUS WITH REMAINING CERVICAL STUMP: Chronic | ICD-10-CM

## 2020-12-15 DIAGNOSIS — Z98.890 OTHER SPECIFIED POSTPROCEDURAL STATES: Chronic | ICD-10-CM

## 2020-12-15 DIAGNOSIS — Z01.818 ENCOUNTER FOR OTHER PREPROCEDURAL EXAMINATION: ICD-10-CM

## 2020-12-15 LAB
ANION GAP SERPL CALC-SCNC: 9 MMOL/L — SIGNIFICANT CHANGE UP (ref 5–17)
BUN SERPL-MCNC: 12 MG/DL — SIGNIFICANT CHANGE UP (ref 7–23)
CALCIUM SERPL-MCNC: 9.7 MG/DL — SIGNIFICANT CHANGE UP (ref 8.4–10.5)
CHLORIDE SERPL-SCNC: 97 MMOL/L — SIGNIFICANT CHANGE UP (ref 96–108)
CO2 SERPL-SCNC: 25 MMOL/L — SIGNIFICANT CHANGE UP (ref 22–31)
CREAT SERPL-MCNC: 0.83 MG/DL — SIGNIFICANT CHANGE UP (ref 0.5–1.3)
GLUCOSE SERPL-MCNC: 103 MG/DL — HIGH (ref 70–99)
HCT VFR BLD CALC: 36.5 % — SIGNIFICANT CHANGE UP (ref 34.5–45)
HGB BLD-MCNC: 12.8 G/DL — SIGNIFICANT CHANGE UP (ref 11.5–15.5)
MCHC RBC-ENTMCNC: 31.6 PG — SIGNIFICANT CHANGE UP (ref 27–34)
MCHC RBC-ENTMCNC: 35.1 GM/DL — SIGNIFICANT CHANGE UP (ref 32–36)
MCV RBC AUTO: 90.1 FL — SIGNIFICANT CHANGE UP (ref 80–100)
NRBC # BLD: 0 /100 WBCS — SIGNIFICANT CHANGE UP (ref 0–0)
PLATELET # BLD AUTO: 309 K/UL — SIGNIFICANT CHANGE UP (ref 150–400)
POTASSIUM SERPL-MCNC: 4.9 MMOL/L — SIGNIFICANT CHANGE UP (ref 3.5–5.3)
POTASSIUM SERPL-SCNC: 4.9 MMOL/L — SIGNIFICANT CHANGE UP (ref 3.5–5.3)
RBC # BLD: 4.05 M/UL — SIGNIFICANT CHANGE UP (ref 3.8–5.2)
RBC # FLD: 11.6 % — SIGNIFICANT CHANGE UP (ref 10.3–14.5)
SODIUM SERPL-SCNC: 131 MMOL/L — LOW (ref 135–145)
WBC # BLD: 7.04 K/UL — SIGNIFICANT CHANGE UP (ref 3.8–10.5)
WBC # FLD AUTO: 7.04 K/UL — SIGNIFICANT CHANGE UP (ref 3.8–10.5)

## 2020-12-15 PROCEDURE — 85027 COMPLETE CBC AUTOMATED: CPT

## 2020-12-15 PROCEDURE — G0463: CPT

## 2020-12-15 PROCEDURE — 80048 BASIC METABOLIC PNL TOTAL CA: CPT

## 2020-12-15 PROCEDURE — 36415 COLL VENOUS BLD VENIPUNCTURE: CPT

## 2020-12-15 PROCEDURE — 80183 DRUG SCRN QUANT OXCARBAZEPIN: CPT

## 2020-12-15 RX ORDER — GLUCOSAMINE/MSM/CHONDROITIN A 750-375MG
1 TABLET ORAL
Qty: 0 | Refills: 0 | DISCHARGE

## 2020-12-15 NOTE — H&P PST ADULT - NEGATIVE NEUROLOGICAL SYMPTOMS
no transient paralysis/no weakness/no paresthesias/no generalized seizures/no focal seizures/no syncope/no tremors/no vertigo/no loss of sensation/no difficulty walking/no headache

## 2020-12-15 NOTE — H&P PST ADULT - NEGATIVE ENMT SYMPTOMS
no hearing difficulty/no ear pain/no tinnitus/no vertigo/no sinus symptoms/no dry mouth/no throat pain/no dysphagia

## 2020-12-15 NOTE — H&P PST ADULT - NSANTHOSAYNRD_GEN_A_CORE
DISPLAY PLAN FREE TEXT neck 17inches/No. CHAU screening performed.  STOP BANG Legend: 0-2 = LOW Risk; 3-4 = INTERMEDIATE Risk; 5-8 = HIGH Risk

## 2020-12-15 NOTE — H&P PST ADULT - NSICDXPROBLEM_GEN_ALL_CORE_FT
PROBLEM DIAGNOSES  Problem: Other diseases of salivary glands  Assessment and Plan: Pre-op instructions given. Pt verbalized understanding  Pending: M/C for elevated BP on meds + Covidpcr results    Problem: Hypertension  Assessment and Plan: CHAU precaution - stop bang 5  Pt instructed to take meds    Problem: Trigeminal neuralgia  Assessment and Plan: Pt instructed to take meds    Problem: Hyperlipidemia  Assessment and Plan: Pt instructed to take med

## 2020-12-15 NOTE — H&P PST ADULT - HISTORY OF PRESENT ILLNESS
69y/o Telugu- Swiss speaking female came to dept with family who assisted with interpretation, reports medical h/o HTN, HDL, Trigeminal neuralgia - left side and Parotis mass, left, presents today for PST for Left Parotidectomy scheduled for 12/28/2020.  Notes: Pt had PST 9/2020, but same was cancelled due to low sodium

## 2020-12-17 LAB — OXCARBAZEPINE SERPL-MCNC: 16 UG/ML — SIGNIFICANT CHANGE UP (ref 10–35)

## 2020-12-26 ENCOUNTER — APPOINTMENT (OUTPATIENT)
Dept: DISASTER EMERGENCY | Facility: CLINIC | Age: 70
End: 2020-12-26

## 2020-12-27 ENCOUNTER — TRANSCRIPTION ENCOUNTER (OUTPATIENT)
Age: 70
End: 2020-12-27

## 2020-12-27 LAB — SARS-COV-2 N GENE NPH QL NAA+PROBE: NOT DETECTED

## 2020-12-28 ENCOUNTER — OUTPATIENT (OUTPATIENT)
Dept: OUTPATIENT SERVICES | Facility: HOSPITAL | Age: 70
LOS: 1 days | End: 2020-12-28
Payer: COMMERCIAL

## 2020-12-28 ENCOUNTER — RESULT REVIEW (OUTPATIENT)
Age: 70
End: 2020-12-28

## 2020-12-28 ENCOUNTER — APPOINTMENT (OUTPATIENT)
Dept: OTOLARYNGOLOGY | Facility: HOSPITAL | Age: 70
End: 2020-12-28

## 2020-12-28 VITALS
SYSTOLIC BLOOD PRESSURE: 150 MMHG | WEIGHT: 219.58 LBS | DIASTOLIC BLOOD PRESSURE: 70 MMHG | TEMPERATURE: 98 F | OXYGEN SATURATION: 98 % | HEART RATE: 73 BPM | HEIGHT: 63 IN | RESPIRATION RATE: 16 BRPM

## 2020-12-28 VITALS
TEMPERATURE: 98 F | OXYGEN SATURATION: 98 % | SYSTOLIC BLOOD PRESSURE: 116 MMHG | RESPIRATION RATE: 16 BRPM | DIASTOLIC BLOOD PRESSURE: 70 MMHG | HEART RATE: 70 BPM

## 2020-12-28 DIAGNOSIS — Z90.711 ACQUIRED ABSENCE OF UTERUS WITH REMAINING CERVICAL STUMP: Chronic | ICD-10-CM

## 2020-12-28 DIAGNOSIS — K11.8 OTHER DISEASES OF SALIVARY GLANDS: ICD-10-CM

## 2020-12-28 DIAGNOSIS — Z98.890 OTHER SPECIFIED POSTPROCEDURAL STATES: Chronic | ICD-10-CM

## 2020-12-28 PROCEDURE — C1889: CPT

## 2020-12-28 PROCEDURE — 42415 EXCISE PAROTID GLAND/LESION: CPT | Mod: AS,LT

## 2020-12-28 PROCEDURE — 88307 TISSUE EXAM BY PATHOLOGIST: CPT | Mod: 26

## 2020-12-28 PROCEDURE — 42410 EXCISE PAROTID GLAND/LESION: CPT | Mod: LT

## 2020-12-28 PROCEDURE — 88307 TISSUE EXAM BY PATHOLOGIST: CPT

## 2020-12-28 PROCEDURE — 42415 EXCISE PAROTID GLAND/LESION: CPT | Mod: LT

## 2020-12-28 RX ORDER — SODIUM CHLORIDE 9 MG/ML
1000 INJECTION, SOLUTION INTRAVENOUS
Refills: 0 | Status: DISCONTINUED | OUTPATIENT
Start: 2020-12-28 | End: 2020-12-28

## 2020-12-28 RX ORDER — MULTIVIT-MIN/FERROUS GLUCONATE 9 MG/15 ML
1 LIQUID (ML) ORAL
Qty: 0 | Refills: 0 | DISCHARGE

## 2020-12-28 RX ORDER — ONDANSETRON 8 MG/1
4 TABLET, FILM COATED ORAL ONCE
Refills: 0 | Status: DISCONTINUED | OUTPATIENT
Start: 2020-12-28 | End: 2020-12-28

## 2020-12-28 RX ORDER — IRBESARTAN 75 MG/1
1 TABLET ORAL
Qty: 0 | Refills: 0 | DISCHARGE

## 2020-12-28 RX ORDER — PREGABALIN 225 MG/1
2 CAPSULE ORAL
Qty: 0 | Refills: 0 | DISCHARGE

## 2020-12-28 RX ORDER — HYDROMORPHONE HYDROCHLORIDE 2 MG/ML
0.5 INJECTION INTRAMUSCULAR; INTRAVENOUS; SUBCUTANEOUS
Refills: 0 | Status: DISCONTINUED | OUTPATIENT
Start: 2020-12-28 | End: 2020-12-28

## 2020-12-28 RX ORDER — METOPROLOL TARTRATE 50 MG
1 TABLET ORAL
Qty: 0 | Refills: 0 | DISCHARGE

## 2020-12-28 RX ORDER — GLUCOSAMINE HCL/CHONDROITIN SU 500-400 MG
1 CAPSULE ORAL
Qty: 0 | Refills: 0 | DISCHARGE

## 2020-12-28 RX ORDER — OXCARBAZEPINE 300 MG/1
1 TABLET, FILM COATED ORAL
Qty: 0 | Refills: 0 | DISCHARGE

## 2020-12-28 RX ORDER — OXYCODONE HYDROCHLORIDE 5 MG/1
5 TABLET ORAL ONCE
Refills: 0 | Status: DISCONTINUED | OUTPATIENT
Start: 2020-12-28 | End: 2020-12-28

## 2020-12-28 RX ORDER — FENOFIBRATE,MICRONIZED 130 MG
1 CAPSULE ORAL
Qty: 0 | Refills: 0 | DISCHARGE

## 2020-12-28 RX ORDER — ALENDRONATE SODIUM 70 MG/1
1 TABLET ORAL
Qty: 0 | Refills: 0 | DISCHARGE

## 2020-12-28 RX ORDER — ROSUVASTATIN CALCIUM 5 MG/1
1 TABLET ORAL
Qty: 0 | Refills: 0 | DISCHARGE

## 2020-12-28 RX ORDER — DIAZEPAM 5 MG
1 TABLET ORAL
Qty: 0 | Refills: 0 | DISCHARGE

## 2020-12-28 RX ORDER — CIPROFLOXACIN LACTATE 400MG/40ML
1 VIAL (ML) INTRAVENOUS
Qty: 8 | Refills: 0
Start: 2020-12-28 | End: 2020-12-31

## 2020-12-28 RX ADMIN — SODIUM CHLORIDE 50 MILLILITER(S): 9 INJECTION, SOLUTION INTRAVENOUS at 10:31

## 2020-12-28 NOTE — ASU DISCHARGE PLAN (ADULT/PEDIATRIC) - ASU DC SPECIAL INSTRUCTIONSFT
DO NOT GET INCISION WET    TAKE ANTIBIOTIC DAILY    EMPTY DRAIN DAILY AND RECORD OUTPUT    TAKE PERCOCET FOR SEVERE PAIN    DO NOT DRIVE WHILE TAKING PERCOCET    NO HEAVY LIFTING, BENDING OR STRAINING    FOLLOW UP WITH DR. PINEDA THIS WEEK, PLEASE CALL THE OFFICE FOR AN APPOINTMENT

## 2020-12-28 NOTE — ASU PATIENT PROFILE, ADULT - LANGUAGE ASSISTANCE NEEDED
Pt speaks Pakistani & Mongolian/No-Patient/Caregiver offered and refused free interpretation services. Yes-Patient/Caregiver accepts free interpretation services...

## 2020-12-28 NOTE — ASU DISCHARGE PLAN (ADULT/PEDIATRIC) - CARE PROVIDER_API CALL
Kevin Avendano)  [facility]; Otolaryngology  [address] [place] [address]  Phone: [phone]  Fax: [phone]  Follow Up Time:

## 2020-12-28 NOTE — ASU PATIENT PROFILE, ADULT - NS PRO ABUSE SCREEN AFRAID ANYONE YN
GENERAL SURGERY POST OP NOTE    OPERATION: Procedure(s):  LAPAROSCOPIC CHOLECYSTECTOMY W/CHOLANGIOGRAMS  POST OP: 2 Day Post-Op     SUBJECTIVE: Patient found lying in bed, states she is feeling much better, would like to take a shower and go home.  Patient states pain is well controlled, denies nausea or vomiting.      OBJECTIVE:   Blood pressure 95/60, pulse 72, temperature 98.9 °F (37.2 °C), temperature source Oral, resp. rate 16, height 5' 2\" (1.575 m), weight 98.6 kg, SpO2 93 %.  RECENT WEIGHTS:  Weight    09/06/18 2217   Weight: 98.6 kg     General: Alert, No acute distress  Abdomen: soft, nondistended, diffuse tenderness, umbilical surgical incision with small amount of ecchymosis, no drainage. Remaining incisions clean, dry and intact.   Neuro: grossly normal  Intake/Output:    Intake/Output Summary (Last 24 hours) at 09/09/18 0944  Last data filed at 09/08/18 1813   Gross per 24 hour   Intake              400 ml   Output                0 ml   Net              400 ml      Last Stool Occurrence:  1 (09/09/18 0500)    Laboratory Results:    Recent Labs  Lab 09/09/18  0500 09/08/18  1000 09/08/18  0506   WBC 11.3* 10.9 12.4*   RBC 4.20 4.34 4.22   HCT 39.0 40.8 38.8   HGB 12.8 13.4 12.9    290 273       Recent Labs  Lab 09/09/18  0500 09/08/18  1000 09/08/18  0506 09/07/18  0850   SODIUM 141 139  --  138   POTASSIUM 3.9 3.7  --  4.2   CHLORIDE 107 104  --  105   CO2 22 26  --  25   GLUCOSE 158* 102*  --  95   BUN 10 9 8 11   CREATININE 0.70 0.76  --  0.79   CALCIUM 8.1* 8.9  --  8.5   ALBUMIN 3.0* 3.2*  --  3.1*   BILIRUBIN 0.5 0.9 0.8 0.5   ALKPT 89 87 87 75   * 255* 313* 237*   * 480*  --  408*   BCRAT 14 12  --  14       Surgical Care Improvement Project:  Cuello: No  VTE Pharmacologic Prophylaxis: Yes  VTE Mechanical Prophylaxis: Yes  Antibiotics D/C'd within 24 hours of surgery end: Yes  Central Line: No  Ulcer prophylaxis:Pepcid    DIAGNOSIS:  Cholelithiasis s/p laparoscopic  cholecystectomy POD #2    PLAN:  -ERCP with removal of 2 stones.   -Regular diet  -OK to shower and discharge home  -PRN antiemetics and pain medications  -Medical management per attending    RADHA Shelley [phone]  After 5 pm please page the on call surgeon for the Acute Care Service at [phone] with any emergent concerns      I have reviewed the chart including labs, imaging, consults and I have interviewed and examined the patient. I agree with the above note, assessment and treatment plan as above with modifications/addendums as appropriate.      Aldair Ji M.D.  Acute Care Surgery  [phone]       no

## 2020-12-28 NOTE — ASU PATIENT PROFILE, ADULT - PMH
COVID-19 virus antibody negative  10/12/20  DDD (degenerative disc disease), cervical    DDD (degenerative disc disease), lumbar    Hyperlipidemia    Hypertension    Leg swelling    Osteoarthritis    Trigeminal neuralgia    Warthin's tumor  left parotid

## 2020-12-29 PROBLEM — Z01.84 ENCOUNTER FOR ANTIBODY RESPONSE EXAMINATION: Chronic | Status: ACTIVE | Noted: 2020-12-14

## 2020-12-31 ENCOUNTER — APPOINTMENT (OUTPATIENT)
Dept: OTOLARYNGOLOGY | Facility: CLINIC | Age: 70
End: 2020-12-31
Payer: MEDICAID

## 2020-12-31 LAB — SURGICAL PATHOLOGY STUDY: SIGNIFICANT CHANGE UP

## 2020-12-31 PROCEDURE — 99024 POSTOP FOLLOW-UP VISIT: CPT

## 2020-12-31 NOTE — REASON FOR VISIT
[Post-Operative Visit] : a post-operative visit [Family Member] : family member [Other: _____] : [unfilled] [FreeTextEntry2] : s/p parotidectomy on 12/28/2020

## 2020-12-31 NOTE — HISTORY OF PRESENT ILLNESS
[None] : No associated symptoms are reported. [de-identified] : Mrs. Laurent is s/p parotidectomy on 12/28/2020 for Warthin's tumor. Presents today for drain removal. Reports feeling well. Denies pain, dysphagia, dysphonia and or dyspnea. States drain has put out about 10 cc of serosanguineous fluid in the last 24 hrs. \par  Denies recent cough, fever, chill, or changes in taste or smell

## 2021-01-06 ENCOUNTER — APPOINTMENT (OUTPATIENT)
Dept: OTOLARYNGOLOGY | Facility: CLINIC | Age: 71
End: 2021-01-06
Payer: MEDICAID

## 2021-01-06 PROCEDURE — 99024 POSTOP FOLLOW-UP VISIT: CPT

## 2021-01-06 NOTE — HISTORY OF PRESENT ILLNESS
[de-identified] : Pt is here today post left parotidectomy on 12/28/2020 for Warthin's tumor. pt is doing well, and no c.o. pt has no neck/face redness, no neck/face mass or weakness, no difficulty swallowing, no neck pain, no painful swallowing and no chills. final path cw Warthin's tumor. \par

## 2021-01-06 NOTE — REASON FOR VISIT
[Subsequent Evaluation] : a subsequent evaluation for [Other: _____] : [unfilled] [FreeTextEntry2] : post op parotidectomy

## 2021-01-06 NOTE — CONSULT LETTER
[Dear  ___] : Dear  [unfilled], [Please see my note below.] : Please see my note below. [Sincerely,] : Sincerely, [Courtesy Letter:] : I had the pleasure of seeing your patient, [unfilled], in my office today. [FreeTextEntry2] : Linwood Montilla MD ([place], [place]) [FreeTextEntry3] : Claudia Gutierrez ([place]) PAC\par Kevin Avendano MD, FACS\par \par    [facility]\par Associate Chair\par    Department of Otolaryngology\par \par Professor\par Otolaryngology & Molecular Medicine\par [employer] [employer] [facility] [employer]\par \par [facility]\par [address]\[place] [address]\par Tel: [phone]\par \par

## 2021-03-09 ENCOUNTER — RX RENEWAL (OUTPATIENT)
Age: 71
End: 2021-03-09

## 2021-03-09 ENCOUNTER — APPOINTMENT (OUTPATIENT)
Dept: NEUROLOGY | Facility: CLINIC | Age: 71
End: 2021-03-09
Payer: MEDICAID

## 2021-03-09 VITALS
WEIGHT: 230 LBS | SYSTOLIC BLOOD PRESSURE: 159 MMHG | HEART RATE: 78 BPM | BODY MASS INDEX: 40.75 KG/M2 | HEIGHT: 63 IN | DIASTOLIC BLOOD PRESSURE: 89 MMHG

## 2021-03-09 VITALS — TEMPERATURE: 97.9 F

## 2021-03-09 PROCEDURE — 99072 ADDL SUPL MATRL&STAF TM PHE: CPT

## 2021-03-09 PROCEDURE — 99214 OFFICE O/P EST MOD 30 MIN: CPT

## 2021-03-09 RX ORDER — OXCARBAZEPINE 300 MG/1
300 TABLET, FILM COATED ORAL
Qty: 90 | Refills: 4 | Status: ACTIVE | COMMUNITY
Start: 2020-07-30 | End: 1900-01-01

## 2021-03-09 NOTE — HISTORY OF PRESENT ILLNESS
[FreeTextEntry1] : Patient presents with left facial pain from trigeminal neuralgia.  \par \par She has no pain currently on trileptal 300mg BID.  Had left parotid surgery and no exacerbation.  \par \par Today she c/o episodic neck muscle stiffness which travels down to her right foot and toes curl down, right side.  Happens almost daily and has been going on for many years.  She did not address with me or other neurologists because the trigeminal neuralgia was more of an issue. \par \par

## 2021-03-09 NOTE — ASSESSMENT
[FreeTextEntry1] : Patient is doing well in terms of trigeminal neuralgia pain and will continue trileptal 300mg BID. \par \par The other symptoms in her neck and foot sound dystonic to me and will have her be evaluated by movement disorders. \par \par f/u with me in 6 months.

## 2021-04-14 NOTE — PROGRESS NOTE ADULT - SUBJECTIVE AND OBJECTIVE BOX
HPI:  This is a 70 year-old-female with PMHx of HTN who was admitted to the critical care unit for hyponatremia. Patient was originally presented to the ambulatory surgery unit today for left parotid gland tumor removal. Presurgical testing labs showed sodium of 120. Patient was recently taking hydrochlorothiazide and Trileptal for trigeminal. Critical care was called for hyponatremia and electrolyte imbalance. Patient denies dizziness, shortness of breath, chest pain, palpitations, cough or sputum production    Subjective  Feeling better.   Mild dizziness this AM after blood draw but none since then.             PAST MEDICAL & SURGICAL HISTORY:  Warthin&#x27;s tumor  left parotid    Leg swelling    Hypertension    DDD (degenerative disc disease), cervical    DDD (degenerative disc disease), lumbar    Osteoarthritis    Hyperlipidemia    Trigeminal neuralgia    History of partial hysterectomy  benign mass, 1998    H/O varicose vein ligation  2006 left and 2001 right        MedsMEDICATIONS  (STANDING):  atorvastatin 80 milliGRAM(s) Oral at bedtime  calcium carbonate 1250 mG  + Vitamin D (OsCal 500 + D) 1 Tablet(s) Oral two times a day  cyanocobalamin 1000 MICROGram(s) Oral daily  fenofibrate Tablet 145 milliGRAM(s) Oral daily  magnesium oxide 400 milliGRAM(s) Oral daily  metoprolol succinate ER 25 milliGRAM(s) Oral daily  multivitamin/minerals 1 Tablet(s) Oral daily  OXcarbazepine 300 milliGRAM(s) Oral two times a day    MEDICATIONS  (PRN):  diazepam    Tablet 10 milliGRAM(s) Oral three times a day PRN anxiety      Vital Signs Last 24 Hrs  T(C): 36.8 (15 Oct 2020 05:29), Max: 36.8 (14 Oct 2020 21:28)  T(F): 98.3 (15 Oct 2020 05:29), Max: 98.3 (14 Oct 2020 21:28)  HR: 77 (15 Oct 2020 05:29) (76 - 85)  BP: 142/73 (15 Oct 2020 05:29) (138/69 - 173/94)  BP(mean): 87 (14 Oct 2020 12:00) (87 - 87)  RR: 15 (15 Oct 2020 05:29) (15 - 16)  SpO2: 97% (15 Oct 2020 05:29) (96% - 97%)  I&O's Summary    14 Oct 2020 07:01  -  15 Oct 2020 07:00  --------------------------------------------------------  IN: 960 mL / OUT: 0 mL / NET: 960 mL    15 Oct 2020 07:01  -  15 Oct 2020 09:24  --------------------------------------------------------  IN: 820 mL / OUT: 0 mL / NET: 820 mL        PHYSICAL EXAM:  GENERAL: NAD  NECK: Supple  NERVOUS SYSTEM:  awake and alert  HEART: S1s2 NL , RRR  CHEST/LUNG: Clear to percussion bilaterally  ABDOMEN: Soft, Nontender, Nondistended; Bowel sounds present  EXTREMITIES:  No edema      LABS:  10-15    138  |  101  |  23  ----------------------------<  123<H>  5.1   |  26  |  1.10    Ca    10.3      15 Oct 2020 05:30  Mg     2.3     10-15            Imaging Personally Reviewed:  [ ] YES  [ ] NO        Care Discussed with Consultants/Other Providers [ x] YES  [ ] NO    HYponatremia  1. Normovolemic  Hyponatremia SIADH - asymptomatic, suspect combination oxcarbamazepine use (as increased dose) and HCTZ use  2. Parotid gland tumor   3. Hypertension  4. Trigeminal neuralgia    Plan  - trend sodium  - Tolvaptan per nephrology, will liberalize fluid as s/p tolvaptan  -Continue anti-hypertensive meds but hold HCTZ  -Neuro eval appreciated, no good alternative to trileptal  -Monitor BP and vital signs  -Check orthostatic vitals  -case d/w patient at bedside  - F/u surgery regarding plan  -Oral diet  - DVT prophylaxis  - OOB to chair  - Dispo planning discharge vs. transfer out of ICU today   HPI:  This is a 70 year-old-female with PMHx of HTN who was admitted to the critical care unit for hyponatremia. Patient was originally presented to the ambulatory surgery unit today for left parotid gland tumor removal. Presurgical testing labs showed sodium of 120. Patient was recently taking hydrochlorothiazide and Trileptal for trigeminal. Critical care was called for hyponatremia and electrolyte imbalance. Patient denies dizziness, shortness of breath, chest pain, palpitations, cough or sputum production    Subjective  Feeling better.   Mild dizziness this AM after blood draw but none since then.   Pos neck pain. not new.           PAST MEDICAL & SURGICAL HISTORY:  Warthin&#x27;s tumor  left parotid    Leg swelling    Hypertension    DDD (degenerative disc disease), cervical    DDD (degenerative disc disease), lumbar    Osteoarthritis    Hyperlipidemia    Trigeminal neuralgia    History of partial hysterectomy  benign mass, 1998    H/O varicose vein ligation  2006 left and 2001 right        MedsMEDICATIONS  (STANDING):  atorvastatin 80 milliGRAM(s) Oral at bedtime  calcium carbonate 1250 mG  + Vitamin D (OsCal 500 + D) 1 Tablet(s) Oral two times a day  cyanocobalamin 1000 MICROGram(s) Oral daily  fenofibrate Tablet 145 milliGRAM(s) Oral daily  magnesium oxide 400 milliGRAM(s) Oral daily  metoprolol succinate ER 25 milliGRAM(s) Oral daily  multivitamin/minerals 1 Tablet(s) Oral daily  OXcarbazepine 300 milliGRAM(s) Oral two times a day    MEDICATIONS  (PRN):  diazepam    Tablet 10 milliGRAM(s) Oral three times a day PRN anxiety      Vital Signs Last 24 Hrs  T(C): 36.8 (15 Oct 2020 05:29), Max: 36.8 (14 Oct 2020 21:28)  T(F): 98.3 (15 Oct 2020 05:29), Max: 98.3 (14 Oct 2020 21:28)  HR: 77 (15 Oct 2020 05:29) (76 - 85)  BP: 142/73 (15 Oct 2020 05:29) (138/69 - 173/94)  BP(mean): 87 (14 Oct 2020 12:00) (87 - 87)  RR: 15 (15 Oct 2020 05:29) (15 - 16)  SpO2: 97% (15 Oct 2020 05:29) (96% - 97%)  I&O's Summary    14 Oct 2020 07:01  -  15 Oct 2020 07:00  --------------------------------------------------------  IN: 960 mL / OUT: 0 mL / NET: 960 mL    15 Oct 2020 07:01  -  15 Oct 2020 09:24  --------------------------------------------------------  IN: 820 mL / OUT: 0 mL / NET: 820 mL        PHYSICAL EXAM:  GENERAL: NAD  NECK: Supple  NERVOUS SYSTEM:  awake and alert  HEART: S1s2 NL , RRR  CHEST/LUNG: Clear to percussion bilaterally  ABDOMEN: Soft, Nontender, Nondistended; Bowel sounds present  EXTREMITIES:  No edema      LABS:  10-15    138  |  101  |  23  ----------------------------<  123<H>  5.1   |  26  |  1.10    Ca    10.3      15 Oct 2020 05:30  Mg     2.3     10-15            Imaging Personally Reviewed:  [ ] YES  [ ] NO        Care Discussed with Consultants/Other Providers [ x] YES  [ ] NO    Hyponatremia  s/p Tolvaptan  hld HCTZ-no HCTZ upon DC  Liberalize Salt/Water  Cont trileptal but at previous lower dose of 300mg BID(no good alternative)    Trigeminal Neuralgia  Trileptal  f/u with Dr. Patiño(neuro) as outpt    Parotid tumor  f/u ENT(Dr. Avendano)    HTN  Metoprolol    DC planning  PT eval 61yo F w/ h/o paroxysmal Afib (on Eliquis), PAD, gastric sleeve p/w LLE cellulitis after failing outpatient PO therapy (clindamycin x2d).  Per pt had intermittent fevers for 5d prior to admission, w/ Tmax 104. Two days after onset of fever she also noted worsening L leg swelling a/w erythema, pain, and warmth. Reports some relief w/ Tylenol. Pt had a telehealth appointment during which time her doctor prescribed clindamycin (pt completed 2d prior to admission) and recommended that the patient demarcate the erythema. Per pt the erythema continued to progress past the demarcation lines and she continued to have fevers so she decided to come to the ED.    IN THE ED: VSS (Afebrile, HR 80s, /70, RR 16 @ 100% on RA). WBC = 12.29. K+ = 3.2. Lactate = 1.2. Xray Foot/Ankle/Tibia: +soft tissue swelling. US Duplex: negative for DVT. Received morphine 4 x1, potassium repletion vancomycin x1, and NS 1L bolus.    On the floors pt treated w/ vancyomycin, however her LLE did not clinically improve. CT LLE w/w/out IV contrast was significant for cellulitis however did not show any abscess/collectible fluid. ID and wound care consulted. Per wound care pt w/ h/o fungal infection between toes so pt started on nystatin powder. Per ID recs pt transitioned from vancomycin to IV Ancef.    Pt medically and clinically stable for discharge to home on PO Keflex on... 61yo F w/ h/o paroxysmal Afib (on Eliquis), PAD, gastric sleeve p/w LLE cellulitis after failing outpatient PO therapy (clindamycin x2d).  Per pt had intermittent fevers for 5d prior to admission, w/ Tmax 104. Two days after onset of fever she also noted worsening L leg swelling a/w erythema, pain, and warmth. Reports some relief w/ Tylenol. Pt had a telehealth appointment during which time her doctor prescribed clindamycin (pt completed 2d prior to admission) and recommended that the patient demarcate the erythema. Per pt the erythema continued to progress past the demarcation lines and she continued to have fevers so she decided to come to the ED.    IN THE ED: VSS (Afebrile, HR 80s, /70, RR 16 @ 100% on RA). WBC = 12.29. K+ = 3.2. Lactate = 1.2. Xray Foot/Ankle/Tibia: +soft tissue swelling. US Duplex: negative for DVT. Received morphine 4 x1, potassium repletion vancomycin x1, and NS 1L bolus.    On the floors pt treated w/ vancyomycin, however her LLE did not clinically improve. CT LLE w/w/out IV contrast was significant for cellulitis however did not show any abscess/collectible fluid. ID and wound care consulted. Per wound care pt w/ h/o fungal infection between toes so pt started on nystatin powder. Per ID recs pt transitioned from vancomycin to IV Ancef.    Pt medically and clinically stable for discharge to home on PO Keflex on 4/16.

## 2021-04-23 ENCOUNTER — APPOINTMENT (OUTPATIENT)
Dept: NEUROLOGY | Facility: CLINIC | Age: 71
End: 2021-04-23
Payer: MEDICAID

## 2021-04-23 VITALS
SYSTOLIC BLOOD PRESSURE: 140 MMHG | HEIGHT: 63 IN | DIASTOLIC BLOOD PRESSURE: 78 MMHG | WEIGHT: 225 LBS | HEART RATE: 83 BPM | BODY MASS INDEX: 39.87 KG/M2

## 2021-04-23 VITALS — TEMPERATURE: 97.8 F

## 2021-04-23 PROCEDURE — 99215 OFFICE O/P EST HI 40 MIN: CPT

## 2021-04-23 PROCEDURE — 99072 ADDL SUPL MATRL&STAF TM PHE: CPT

## 2021-04-23 RX ORDER — CYCLOBENZAPRINE HYDROCHLORIDE 5 MG/1
5 TABLET, FILM COATED ORAL 3 TIMES DAILY
Qty: 90 | Refills: 3 | Status: ACTIVE | COMMUNITY
Start: 2021-04-23 | End: 1900-01-01

## 2021-04-23 NOTE — PHYSICAL EXAM
[FreeTextEntry1] : On exam patient is awake and alert.  She is pleasant and cooperative with the exam.\par Frequent neck irregular movement is seen.  There is no tremor.  There is limitation of neck movement turning to the right it is better turning to the left.\par Extraocular movements are intact face is symmetric tongue and uvula midline and symmetric shoulder shrug is intact.  Strength is 5/5.  No increase in tone.  Minimal postural tremors.  No resting tremor no intention tremor.\par No posturing of the hands of the feet is seen\par Slow to get up from chair\par Feels unsteady while walking without her cane

## 2021-04-23 NOTE — HISTORY OF PRESENT ILLNESS
[FreeTextEntry1] : This is a 71-year-old right-handed female who presents with chief complaints of rule out cervical dystonia\par At this visit she is accompanied by her daughter Quita.  Patient is Latvian speaking, her daughter is translating\par \par Patient states that at least 10 years ago she was diagnosed as having spinal spondylosis.  She has experienced pain in her neck more so on the right splenius capitis region.  She feels that the muscle contracts and limits her ability to turn to the right side.  She has no difficulty turning to the left.  She denies any head tremor.  She denies any tremors elsewhere.  Her symptoms get worse with prolonged standing.\par She also describes that the spasm in her neck travels to the back lower back and into the right leg.  She also has some low back pain.  Rarely she may experience jerking movement of her legs when she is trying to sleep.  This does not happen during the day.  She denies any restless leg-like symptoms.\par He has not been on any antipsychotic medications.\par He denies any numbness, weakness.\par Review of systems a complete review of systems is performed and is negative except as listed in HPI\par \par past medical history\par Left-sided trigeminal neuralgia which is well controlled\par Varicose vein surgery\par Spinal stenosis\par parotid warthin  tumor on the left status post resection\par Partial hysterectomy\par Hypertension\par High cholesterol\par \par

## 2021-04-23 NOTE — DISCUSSION/SUMMARY
[FreeTextEntry1] : This is a 71-year-old right-handed male who presents with chief complaints of neck pain and limitation of movement.\par Differential includes muscle spasm due to spondylosis versus dystonia.\par Unclear etiology of lower leg symptom question radiculopathy.  Do not see any dystonic features.  Denies RLS\par \par Plan\par Flexeril 5 mg can take up to 3 times a day as tolerated.  Side effect of sedation discussed in detail.  To start with she will need take once a day at bedtime\par Physical therapy\par Prior authorization 400 units of Botox\par All questions were addressed and answered\par Follow-up in 1 month\par Patient feels that the spasm from the neck travels down to the rest of the body if symptoms are not controlled with Botox may consider getting MRI of the lumbar spine

## 2021-06-14 NOTE — H&P PST ADULT - NSICDXPROBLEM_GEN_ALL_CORE_FT
Principal Discharge DX:	Swollen lymph nodes   PROBLEM DIAGNOSES  Problem: Parotid mass  Assessment and Plan: for for left parotidectomy

## 2021-06-22 ENCOUNTER — APPOINTMENT (OUTPATIENT)
Dept: NEUROLOGY | Facility: CLINIC | Age: 71
End: 2021-06-22
Payer: MEDICAID

## 2021-06-22 PROCEDURE — 99214 OFFICE O/P EST MOD 30 MIN: CPT | Mod: 25

## 2021-06-22 PROCEDURE — 64616 CHEMODENERV MUSC NECK DYSTON: CPT | Mod: 50

## 2021-06-22 PROCEDURE — 99072 ADDL SUPL MATRL&STAF TM PHE: CPT

## 2021-06-22 NOTE — DISCUSSION/SUMMARY
[FreeTextEntry1] : This is a 71-year-old right-handed woman who presents with chief complaints of neck pain and limitation of movement.\par Differential includes muscle spasm due to spondylosis versus dystonia.\par Unclear etiology of lower leg symptom question radiculopathy.  I agree that there are no clear dystonic features except maybe the limitation on left turn.  Denies RLS\par \par 1. Physical therapy\par 2. Injected today per procedure note.\par 3. All questions were addressed and answered\par 4. Patient feels that the spasm from the neck travels down to the rest of the body if symptoms are not controlled with Botox may consider getting MRI of the lumbar spine\par 5. On Oxcarbazepine for trigeminal neuralgia. Just increased from 300 bid to tid, recheck sodium in 2 weeks (PMD to check), it was ok on 300 bid, but low on 600 bid \par \par We discussed the above impression, plan and recommendations during the visit. Counseling represented more then 50% of the 30 minute visit time\par

## 2021-06-22 NOTE — PHYSICAL EXAM
[Person] : oriented to person [Place] : oriented to place [Time] : oriented to time [FreeTextEntry1] : On exam patient is awake and alert.  She is pleasant and cooperative with the exam.\par Frequent neck irregular movement is seen.  There is no tremor.  There is limitation of neck movement turning to the right it is better turning to the left. No clear torticollis except subtle right turn at times. . \par \par Extraocular movements are intact face is symmetric tongue and uvula midline and symmetric shoulder shrug is intact.  Strength is 5/5.  No increase in tone.  Minimal postural tremors.  No resting tremor no intention tremor.\par No posturing of the hands of the feet is seen\par Slow to get up from chair\par Feels unsteady while walking without her cane

## 2021-06-22 NOTE — PROCEDURE
[FreeTextEntry1] : The patient received injections with botulinum toxin A (botox), diluted at 5 units/0.1 cc via a 30 ga needle into the following sites, in the usual antiseptic manner. Possible side effects including over-weakening, dysphagia, bleeding, and local infection were discussed.\par \par 1. Right splenius 125/5\par 2. Left splenius 75/3\par \par Total injected 200 units, waste 0 units, total used  200 units \par The patient tolerated the procedure well, with not complications\par \par

## 2021-06-22 NOTE — HISTORY OF PRESENT ILLNESS
[FreeTextEntry1] : This is a 71-year-old right-handed female who presents with chief complaints of rule out cervical dystonia and left sided trigeminal neuralgia. \par At this visit she is accompanied by her daughter Quita.  Patient is British speaking, her daughter is translating\par \par Patient states that at least 10 years ago she was diagnosed as having spinal spondylosis.  She has experienced pain in her neck more so on the right splenius capitis region.  She feels that the muscle contracts and limits her ability to turn to the right side.  She has no difficulty turning to the left.  She denies any head tremor.  She denies any tremors elsewhere.  Her symptoms get worse with prolonged standing.\par She also describes that the spasm in her neck travels to the back lower back and into the right leg.  She also has some low back pain.  Rarely she may experience jerking movement of her legs when she is trying to sleep.  This does not happen during the day.  She denies any restless leg-like symptoms.\par She has not been on any antipsychotic medications. She denies any numbness, weakness.\par Review of systems a complete review of systems is performed and is negative except as listed in HPI\par \par Saw Dr Singh, started flexeril, did not like it, stopped it. MRI showed left parotid adenoma, this has been removed. C-spine MRI showed stenosis.  \par

## 2021-08-17 ENCOUNTER — APPOINTMENT (OUTPATIENT)
Dept: OTOLARYNGOLOGY | Facility: CLINIC | Age: 71
End: 2021-08-17
Payer: MEDICAID

## 2021-08-17 VITALS
DIASTOLIC BLOOD PRESSURE: 85 MMHG | SYSTOLIC BLOOD PRESSURE: 157 MMHG | TEMPERATURE: 97.7 F | RESPIRATION RATE: 16 BRPM | HEART RATE: 65 BPM

## 2021-08-17 DIAGNOSIS — K11.8 OTHER DISEASES OF SALIVARY GLANDS: ICD-10-CM

## 2021-08-17 PROCEDURE — 99072 ADDL SUPL MATRL&STAF TM PHE: CPT

## 2021-08-17 PROCEDURE — 99213 OFFICE O/P EST LOW 20 MIN: CPT

## 2021-08-17 NOTE — HISTORY OF PRESENT ILLNESS
[None] : No associated symptoms are reported. [de-identified] : Pt presents today post left parotidectomy on 12/28/2020 for Warthin's tumor. Accompany by her daughter Jaison. Reports doing well, and no c.o. pt has no neck/face redness, no neck/face mass or weakness, no difficulty swallowing, no neck pain, no painful swallowing and no chills. final path cw Warthin's tumor. \par Reports receiving covid 19 Moderna vaccine second dose on 2/18/2021

## 2021-08-17 NOTE — CONSULT LETTER
[Dear  ___] : Dear  [unfilled], [Courtesy Letter:] : I had the pleasure of seeing your patient, [unfilled], in my office today. [Please see my note below.] : Please see my note below. [Sincerely,] : Sincerely, [FreeTextEntry2] : Linwood Montilla MD ([place], [place])   [FreeTextEntry3] : Kevin Avendano MD, FACS\par \par    [facility]\par Associate Chair\par    Department of Otolaryngology\par \par Professor\par Otolaryngology & Molecular Medicine\par [facility] [employer]\par

## 2021-09-29 ENCOUNTER — APPOINTMENT (OUTPATIENT)
Dept: NEUROLOGY | Facility: CLINIC | Age: 71
End: 2021-09-29
Payer: MEDICAID

## 2021-09-29 PROCEDURE — 99214 OFFICE O/P EST MOD 30 MIN: CPT | Mod: 25

## 2021-09-29 PROCEDURE — 99072 ADDL SUPL MATRL&STAF TM PHE: CPT

## 2021-09-29 PROCEDURE — 64616 CHEMODENERV MUSC NECK DYSTON: CPT

## 2021-09-29 NOTE — PROCEDURE
[FreeTextEntry1] : The patient received injections with botulinum toxin A (botox), diluted at 5 units/0.1 cc via a 30 ga needle into the following sites, in the usual antiseptic manner. Possible side effects including over-weakening, dysphagia, bleeding, and local infection were discussed.\par \par 1. Right splenius 175/7\par 2. Left splenius 125/5\par \par Total injected 300 units, waste 0 units, total used  300 units \par The patient tolerated the procedure well, with not complications\par \par

## 2021-09-29 NOTE — HISTORY OF PRESENT ILLNESS
[FreeTextEntry1] : This is a 71-year-old right-handed female who presents with chief complaints of rule out cervical dystonia and left sided trigeminal neuralgia. \par At this visit she is accompanied by her daughter Quita.  Patient is Guatemalan speaking, her daughter is translating\par \par Patient states that at least 10 years ago she was diagnosed as having spinal spondylosis.  She has experienced pain in her neck more so on the right splenius capitis region.  She feels that the muscle contracts and limits her ability to turn to the right side.  She has no difficulty turning to the left.  She denies any head tremor.  She denies any tremors elsewhere.  Her symptoms get worse with prolonged standing.\par She also describes that the spasm in her neck travels to the back lower back and into the right leg.  She also has some low back pain.  Rarely she may experience jerking movement of her legs when she is trying to sleep.  This does not happen during the day.  She denies any restless leg-like symptoms.\par She has not been on any antipsychotic medications. She denies any numbness, weakness.\par Review of systems a complete review of systems is performed and is negative except as listed in HPI\par \par Saw Dr Singh, started flexeril, did not like it, stopped it. MRI showed left parotid adenoma, this has been removed. C-spine MRI showed stenosis.  \par \par 1. Injected with botox on 6/22/21. No clear benefit. No side effects.\par 2. Still on oxcarbazepine 300 bid for facial pain.

## 2022-01-18 ENCOUNTER — TRANSCRIPTION ENCOUNTER (OUTPATIENT)
Age: 72
End: 2022-01-18

## 2022-01-18 ENCOUNTER — APPOINTMENT (OUTPATIENT)
Dept: NEUROLOGY | Facility: CLINIC | Age: 72
End: 2022-01-18

## 2022-02-18 ENCOUNTER — APPOINTMENT (OUTPATIENT)
Dept: NEUROLOGY | Facility: CLINIC | Age: 72
End: 2022-02-18
Payer: MEDICAID

## 2022-02-18 VITALS — DIASTOLIC BLOOD PRESSURE: 72 MMHG | HEART RATE: 71 BPM | SYSTOLIC BLOOD PRESSURE: 152 MMHG

## 2022-02-18 PROCEDURE — 64616 CHEMODENERV MUSC NECK DYSTON: CPT

## 2022-02-18 PROCEDURE — 99072 ADDL SUPL MATRL&STAF TM PHE: CPT

## 2022-02-18 PROCEDURE — 99214 OFFICE O/P EST MOD 30 MIN: CPT | Mod: 25

## 2022-02-18 NOTE — DISCUSSION/SUMMARY
[FreeTextEntry1] : This is a 71-year-old right-handed woman who presents with chief complaints of neck pain and limitation of movement.\par Differential includes muscle spasm due to spondylosis versus dystonia.\par Unclear etiology of lower leg symptom question radiculopathy.  I agree that there are no clear dystonic features except maybe the limitation on left turn.  Denies RLS\par \par 1. Physical therapy\par 2. Injected today per procedure note.\par 3. Patient feels that the spasm from the neck travels down to the rest of the body if symptoms are not controlled with Botox may consider getting MRI of the lumbar spine\par 4. On Oxcarbazepine for trigeminal neuralgia. Just increased from 300 bid to tid, recheck sodium in 2 weeks (PMD to check), it was ok on 300 bid, but low on 600 bid \par \par We discussed the above impression, plan and recommendations during the visit. Counseling represented more then 50% of the 30 minute visit time

## 2022-02-18 NOTE — REASON FOR VISIT
Nursing: Rx has been refilled x90 days. She is overdue for follow-up. This may be scheduled nonurgently in the next few months. [Follow-Up: _____] : a [unfilled] follow-up visit [FreeTextEntry1] : neck pain

## 2022-02-18 NOTE — HISTORY OF PRESENT ILLNESS
[FreeTextEntry1] : This is a 71-year-old right-handed female who presents with chief complaints of rule out cervical dystonia and left sided trigeminal neuralgia. \par At this visit she is accompanied by her daughter Quita.  Patient is Dominican speaking, her daughter is translating\par \par Patient states that at least 10 years ago she was diagnosed as having spinal spondylosis.  She has experienced pain in her neck more so on the right splenius capitis region.  She feels that the muscle contracts and limits her ability to turn to the right side.  She has no difficulty turning to the left.  She denies any head tremor.  She denies any tremors elsewhere.  Her symptoms get worse with prolonged standing.\par She also describes that the spasm in her neck travels to the back lower back and into the right leg.  She also has some low back pain.  Rarely she may experience jerking movement of her legs when she is trying to sleep.  This does not happen during the day.  She denies any restless leg-like symptoms.\par She has not been on any antipsychotic medications. She denies any numbness, weakness.\par Review of systems a complete review of systems is performed and is negative except as listed in HPI\par \par Saw Dr Singh, started flexeril, did not like it, stopped it. MRI showed left parotid adenoma, this has been removed. C-spine MRI showed stenosis.  \par \par 1. Injected with botox on 6/22/21. Can turn head a little better. No side effects.\par 2. Still on oxcarbazepine 300 bid for facial pain.

## 2022-02-18 NOTE — PROCEDURE
[FreeTextEntry1] : The patient received injections with botulinum toxin A (botox), diluted at 5 units/0.1 cc via a 30 ga needle into the following sites, in the usual antiseptic manner. Possible side effects including over-weakening, dysphagia, bleeding, and local infection were discussed.\par \par 1. Right splenius 175/7 - extended toward trap. \par 2. Left splenius 125/5\par \par Total injected 300 units, waste 0 units, total used  300 units \par The patient tolerated the procedure well, with not complications\par \par

## 2022-03-17 NOTE — ASU PATIENT PROFILE, ADULT - NSCAFFEAMTFREQ_GEN_ALL_CORE_SD
occasional use Lip Wedge Excision Repair Text: Given the location of the defect and the proximity to free margins a full thickness wedge repair was deemed most appropriate.  Using a sterile surgical marker, the appropriate repair was drawn incorporating the defect and placing the expected incisions perpendicular to the vermilion border.  The vermilion border was also meticulously outlined to ensure appropriate reapproximation during the repair.  The area thus outlined was incised through and through with a #15 scalpel blade.  The muscularis and dermis were reaproximated with deep sutures following hemostasis. Care was taken to realign the vermilion border before proceeding with the superficial closure.  Once the vermilion was realigned the superfical and mucosal closure was finished.

## 2022-06-10 ENCOUNTER — APPOINTMENT (OUTPATIENT)
Dept: NEUROLOGY | Facility: CLINIC | Age: 72
End: 2022-06-10
Payer: MEDICAID

## 2022-06-10 VITALS
WEIGHT: 227 LBS | HEIGHT: 63 IN | HEART RATE: 70 BPM | DIASTOLIC BLOOD PRESSURE: 83 MMHG | SYSTOLIC BLOOD PRESSURE: 162 MMHG | BODY MASS INDEX: 40.22 KG/M2

## 2022-06-10 PROCEDURE — 64616 CHEMODENERV MUSC NECK DYSTON: CPT

## 2022-06-10 PROCEDURE — 99214 OFFICE O/P EST MOD 30 MIN: CPT | Mod: 25

## 2022-06-10 NOTE — HISTORY OF PRESENT ILLNESS
[FreeTextEntry1] : This is a 71-year-old right-handed female who presents with chief complaints of rule out cervical dystonia and left sided trigeminal neuralgia. \par At this visit she is accompanied by her daughter Quita.  Patient is Maldivian speaking, her daughter is translating\par \par Patient states that at least 10 years ago she was diagnosed as having spinal spondylosis.  She has experienced pain in her neck more so on the right splenius capitis region.  She feels that the muscle contracts and limits her ability to turn to the right side.  She has no difficulty turning to the left.  She denies any head tremor.  She denies any tremors elsewhere.  Her symptoms get worse with prolonged standing.\par She also describes that the spasm in her neck travels to the back lower back and into the right leg.  She also has some low back pain.  Rarely she may experience jerking movement of her legs when she is trying to sleep.  This does not happen during the day.  She denies any restless leg-like symptoms.\par She has not been on any antipsychotic medications. She denies any numbness, weakness.\par Review of systems a complete review of systems is performed and is negative except as listed in HPI\par \par Saw Dr Singh, started flexeril, did not like it, stopped it. MRI showed left parotid adenoma, this has been removed. C-spine MRI showed stenosis.  \par \par 1. Injected with botox 2/18/22. Can turn head a little better. No side effects.\par 2. Still on oxcarbazepine 300 bid for facial pain.

## 2022-06-10 NOTE — DISCUSSION/SUMMARY
[FreeTextEntry1] : This is a 71-year-old right-handed woman who presents with chief complaints of neck pain and limitation of movement.\par Differential includes muscle spasm due to spondylosis versus dystonia.\par Unclear etiology of lower leg symptom question radiculopathy.  I agree that there are no clear dystonic features except maybe the limitation on left turn.  Denies RLS\par \par 1. Physical therapy\par 2. Injected today per procedure note.\par 3. Patient feels that the spasm from the neck travels down to the rest of the body if symptoms are not controlled with Botox may consider getting MRI of the lumbar spine\par 4. On Oxcarbazepine for trigeminal neuralgia. Just increased from 300 bid to tid, recheck sodium in 2 weeks (PMD is checking), it was ok on 300 bid, but low on 600 bid \par \par We discussed the above impression, plan and recommendations during the visit. Counseling represented more then 50% of the 30 minute visit time

## 2022-09-20 ENCOUNTER — APPOINTMENT (OUTPATIENT)
Dept: NEUROLOGY | Facility: CLINIC | Age: 72
End: 2022-09-20

## 2022-09-20 PROCEDURE — 99214 OFFICE O/P EST MOD 30 MIN: CPT | Mod: 25

## 2022-09-20 PROCEDURE — 64616 CHEMODENERV MUSC NECK DYSTON: CPT

## 2022-09-20 NOTE — HISTORY OF PRESENT ILLNESS
[FreeTextEntry1] : This is a 72-year-old right-handed female who presents with chief complaints of rule out cervical dystonia and left sided trigeminal neuralgia. \par At this visit she is accompanied by her daughter Quita.  Patient is Sammarinese speaking, her daughter is translating\par \par Patient states that at least 10 years ago she was diagnosed as having spinal spondylosis.  She has experienced pain in her neck more so on the right splenius capitis region.  She feels that the muscle contracts and limits her ability to turn to the right side.  She has no difficulty turning to the left.  She denies any head tremor.  She denies any tremors elsewhere.  Her symptoms get worse with prolonged standing.\par She also describes that the spasm in her neck travels to the back lower back and into the right leg.  She also has some low back pain.  Rarely she may experience jerking movement of her legs when she is trying to sleep.  This does not happen during the day.  She denies any restless leg-like symptoms.\par She has not been on any antipsychotic medications. She denies any numbness, weakness.\par Review of systems a complete review of systems is performed and is negative except as listed in HPI\par \par Saw Dr Singh, started flexeril, did not like it, stopped it. MRI showed left parotid adenoma, this has been removed. C-spine MRI showed stenosis.  \par \par 1. Injected with botox 6/10/22. Can turn head a little better. No side effects.\par 2. Still on oxcarbazepine 300 bid for facial pain. \par 3. She continues to have left back pain, local, no radiation to leg. More when neck feels stiff on left.

## 2022-09-20 NOTE — DISCUSSION/SUMMARY
[FreeTextEntry1] : This is a 72-year-old right-handed woman who presents with chief complaints of neck pain and limitation of movement.\par Differential includes muscle spasm due to spondylosis versus dystonia.\par Unclear etiology of lower leg symptom question radiculopathy.  I agree that there are no clear dystonic features except maybe the limitation on left turn.  Denies RLS\par \par 1. Physical therapy\par 2. Injected today per procedure note.\par 3. Patient feels that the spasm from the neck travels down to the rest of the body.  Since symptoms are not controlled with Botox will get  MRI of the lumbar spine\par 4. On Oxcarbazepine for trigeminal neuralgia. Just increased from 300 bid to tid (PMD is checking sodium), it was ok on 300 bid, but low on 600 bid \par \par We discussed the above impression, plan and recommendations during the visit. Counseling represented more then 50% of the 30 minute visit time

## 2022-10-12 ENCOUNTER — OUTPATIENT (OUTPATIENT)
Dept: OUTPATIENT SERVICES | Facility: HOSPITAL | Age: 72
LOS: 1 days | End: 2022-10-12
Payer: COMMERCIAL

## 2022-10-12 ENCOUNTER — APPOINTMENT (OUTPATIENT)
Dept: MRI IMAGING | Facility: CLINIC | Age: 72
End: 2022-10-12

## 2022-10-12 DIAGNOSIS — M54.50 LOW BACK PAIN, UNSPECIFIED: ICD-10-CM

## 2022-10-12 DIAGNOSIS — Z90.711 ACQUIRED ABSENCE OF UTERUS WITH REMAINING CERVICAL STUMP: Chronic | ICD-10-CM

## 2022-10-12 DIAGNOSIS — Z98.890 OTHER SPECIFIED POSTPROCEDURAL STATES: Chronic | ICD-10-CM

## 2022-10-12 PROCEDURE — 72148 MRI LUMBAR SPINE W/O DYE: CPT

## 2022-10-12 PROCEDURE — 72148 MRI LUMBAR SPINE W/O DYE: CPT | Mod: 26

## 2023-01-03 ENCOUNTER — APPOINTMENT (OUTPATIENT)
Dept: NEUROLOGY | Facility: CLINIC | Age: 73
End: 2023-01-03
Payer: MEDICAID

## 2023-01-03 PROCEDURE — 99214 OFFICE O/P EST MOD 30 MIN: CPT | Mod: 25

## 2023-01-03 NOTE — DISCUSSION/SUMMARY
[FreeTextEntry1] : This is a 72-year-old right-handed woman who presents with chief complaints of neck pain and limitation of movement.\par Differential includes muscle spasm due to spondylosis versus dystonia.\par Unclear etiology of lower leg symptom question radiculopathy.  I agree that there are no clear dystonic features except maybe the limitation on left turn.  Denies RLS\par \par 1. Physical therapy\par 2. Injected today per procedure note.\par 3. On Oxcarbazepine for trigeminal neuralgia. Just increased from 300 bid to tid (PMD is checking sodium), it was ok on 300 bid, but low on 600 bid \par \par We discussed the above impression, plan and recommendations during the visit. Counseling represented more then 50% of the 30 minute visit time

## 2023-01-03 NOTE — HISTORY OF PRESENT ILLNESS
[FreeTextEntry1] : This is a 72-year-old right-handed female who presents with chief complaints of rule out cervical dystonia and left sided trigeminal neuralgia. \par At this visit she is accompanied by her daughter Quita.  Patient is Ethiopian speaking, her daughter is translating\par \par Patient states that at least 10 years ago she was diagnosed as having spinal spondylosis.  She has experienced pain in her neck more so on the right splenius capitis region.  She feels that the muscle contracts and limits her ability to turn to the right side.  She has no difficulty turning to the left.  She denies any head tremor.  She denies any tremors elsewhere.  Her symptoms get worse with prolonged standing.\par She also describes that the spasm in her neck travels to the back lower back and into the right leg.  She also has some low back pain.  Rarely she may experience jerking movement of her legs when she is trying to sleep.  This does not happen during the day.  She denies any restless leg-like symptoms.\par She has not been on any antipsychotic medications. She denies any numbness, weakness.\par Review of systems a complete review of systems is performed and is negative except as listed in HPI\par \par Saw Dr Singh, started flexeril, did not like it, stopped it. MRI showed left parotid adenoma, this has been removed. C-spine MRI showed stenosis.  \par \par 1. Injected with botox 6/10/22. Can turn head a little better. No side effects.\par 2. Still on oxcarbazepine 300 bid for facial pain. \par 3. Lower back pain stopped

## 2023-01-23 ENCOUNTER — APPOINTMENT (OUTPATIENT)
Dept: PULMONOLOGY | Facility: CLINIC | Age: 73
End: 2023-01-23
Payer: MEDICAID

## 2023-01-23 VITALS
TEMPERATURE: 97.7 F | SYSTOLIC BLOOD PRESSURE: 160 MMHG | HEIGHT: 63 IN | OXYGEN SATURATION: 94 % | WEIGHT: 230 LBS | HEART RATE: 76 BPM | DIASTOLIC BLOOD PRESSURE: 88 MMHG | BODY MASS INDEX: 40.75 KG/M2

## 2023-01-23 PROCEDURE — 99203 OFFICE O/P NEW LOW 30 MIN: CPT

## 2023-01-23 NOTE — HISTORY OF PRESENT ILLNESS
[Former] : former [>= 20 pack years] : >= 20 pack years [Never] : never [TextBox_4] : 72 year old patient presents for evaluation of lung status.  She has been a smoker. \par Her  passed away from lung cancer\par \par She stopped smoking 10 years ago\par \par She denies dyspnea, cough, wheeze, sputum production. 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.\par \par \par \par \par \par PSH:\par \par partial hysterectomy\par parotid tumor\par \par \par PMH:\par \par HTN\par \par cervical spine arthritis\par \par Warthin tumor resected\par \par trigeminal neuralgia\par \par Diabetes\par \par HLD\par \par phlebitis \par \par SH:\par \par \par former smoker\par \par ETOH:  occasional\par \par \par Occupation: retired, worked as cement factory\par \par has had  exposure to dust\par \par no pets\par \par ALLERGY:\par \par allergic to Keflex\par \par Reaction is rash\par \par environmental/seasonal allergy:  possible mild\par \par \par \par Review of Systems:\par \par No rash, skin problems\par \par no sinusitis, sinus infections, some nasal obstruction\par no dysphagia\par no dry mouth\par \par no pneumonia\par no wheeze\par no lung cancer\par \par no CAD\par no MI\par no chest pain\par no murmur\par no CHF\par no HTN\par has LE edema\par \par no peptic ulcer or gastritis\par no GERD\par no abdominal pain\par \par has fatty liver, resolved\par \par no thyroid disease\par \par no bleeding\par \par no DVT or PE\par \par no kidney disease\par \par no stroke\par no seizure\par \par She has had had pneumonia vaccine several years ago, not usre which one\par \par \par \par \par \par \par \par \par \par \par \par   [TextBox_11] : >1 [TextBox_13] : 40 [YearQuit] : 2013 [Snoring] : no snoring [Unintentional Sleep while Active] : no unintentional sleep while active [Witnessed Apneas] : no witnessed apneas

## 2023-01-23 NOTE — PHYSICAL EXAM
[No Acute Distress] : no acute distress [Well Nourished] : well nourished [No Deformities] : no deformities [Enlarged Base of the Tongue] : enlarged base of the tongue [III] : Mallampati Class: III [Normal Appearance] : normal appearance [Supple] : supple [Normal Rate/Rhythm] : normal rate/rhythm [Normal S1, S2] : normal s1, s2 [No Murmurs] : no murmurs [No Resp Distress] : no resp distress [Clear to Auscultation Bilaterally] : clear to auscultation bilaterally [No Abnormalities] : no abnormalities [Benign] : benign [Not Tender] : not tender [No Masses] : no masses [Soft] : soft [No Clubbing] : no clubbing [No Cyanosis] : no cyanosis [No Focal Deficits] : no focal deficits [Oriented x3] : oriented x3 [Normal Affect] : normal affect [TextBox_44] : short

## 2023-01-23 NOTE — DISCUSSION/SUMMARY
[FreeTextEntry1] : 72 year old former smoker who denies symptoms of COPD\par \par She has smoked >30 pack years.  Stopped smoking 10 years ago\par \par She has HTN, DM\par \par \par + ppd  believes had BCG\par \par PLAN\par \par LD CT chest .  Risks benefits discussed.  Daughter is present who present who has brought in patient\par \par Return for PFT next visit.\par order Quantiferon testing\par \par She has had pneumonia vaccine\par \par Kang Ji MD

## 2023-02-01 ENCOUNTER — NON-APPOINTMENT (OUTPATIENT)
Age: 73
End: 2023-02-01

## 2023-02-01 VITALS — BODY MASS INDEX: 40.75 KG/M2 | WEIGHT: 230 LBS | HEIGHT: 63 IN

## 2023-02-01 NOTE — DATA REVIEWED
Pt ambulated out at this time to await boyfriend to come get her in lobby.  Pt verbalized understanding of discharge instructions and follow up at this time.  Pt stable on discharge.   [No studies available for review at this time.] : No studies available for review at this time.

## 2023-02-01 NOTE — HISTORY OF PRESENT ILLNESS
[Former] : Former [TextBox_13] : Referred by Dr. Laurent\par \par Ms. RENE LAURENT is a 72 year old woman with a history of nicotine dependence\par \par  Over the telephone today we reviewed and confirmed that the patient meets screening eligibility criteria:\par \par -Age: 72 years old\par \par Smoking status:\par \par -Former smoker\par \par -Number of pack(s) per day: 1\par \par -Number of years smoked: 40\par \par -Number of pack years smokin\par \par -Number of years since quitting smoking: 10\par \par -Quit year: \par \par \par Ms. LAURENT denies any personal history of lung cancer. Denies any s/s of lung cancer. H/o of lung cancer in uncle. Denies any history of lung disease. Denies any history of occupational exposures\par  [PacksperYear] : 40

## 2023-02-03 ENCOUNTER — OUTPATIENT (OUTPATIENT)
Dept: OUTPATIENT SERVICES | Facility: HOSPITAL | Age: 73
LOS: 1 days | End: 2023-02-03
Payer: COMMERCIAL

## 2023-02-03 ENCOUNTER — APPOINTMENT (OUTPATIENT)
Dept: CT IMAGING | Facility: IMAGING CENTER | Age: 73
End: 2023-02-03
Payer: MEDICAID

## 2023-02-03 DIAGNOSIS — Z90.711 ACQUIRED ABSENCE OF UTERUS WITH REMAINING CERVICAL STUMP: Chronic | ICD-10-CM

## 2023-02-03 DIAGNOSIS — Z98.890 OTHER SPECIFIED POSTPROCEDURAL STATES: Chronic | ICD-10-CM

## 2023-02-03 DIAGNOSIS — Z87.891 PERSONAL HISTORY OF NICOTINE DEPENDENCE: ICD-10-CM

## 2023-02-03 PROCEDURE — 71271 CT THORAX LUNG CANCER SCR C-: CPT | Mod: 26

## 2023-02-03 PROCEDURE — 71271 CT THORAX LUNG CANCER SCR C-: CPT

## 2023-02-09 ENCOUNTER — NON-APPOINTMENT (OUTPATIENT)
Age: 73
End: 2023-02-09

## 2023-02-09 LAB
M TB IFN-G BLD-IMP: POSITIVE
QUANTIFERON TB PLUS MITOGEN MINUS NIL: 9.31 IU/ML
QUANTIFERON TB PLUS NIL: 0.57 IU/ML
QUANTIFERON TB PLUS TB1 MINUS NIL: 1.86 IU/ML
QUANTIFERON TB PLUS TB2 MINUS NIL: 1.87 IU/ML

## 2023-03-06 ENCOUNTER — APPOINTMENT (OUTPATIENT)
Dept: PULMONOLOGY | Facility: CLINIC | Age: 73
End: 2023-03-06
Payer: MEDICAID

## 2023-03-06 VITALS
DIASTOLIC BLOOD PRESSURE: 80 MMHG | OXYGEN SATURATION: 95 % | TEMPERATURE: 97.7 F | BODY MASS INDEX: 38.62 KG/M2 | HEART RATE: 71 BPM | SYSTOLIC BLOOD PRESSURE: 140 MMHG | WEIGHT: 218 LBS

## 2023-03-06 DIAGNOSIS — Z22.7 LATENT TUBERCULOSIS: ICD-10-CM

## 2023-03-06 PROCEDURE — 99214 OFFICE O/P EST MOD 30 MIN: CPT

## 2023-03-06 NOTE — HISTORY OF PRESENT ILLNESS
[Former] : former [>= 20 pack years] : >= 20 pack years [Never] : never [TextBox_4] : 73 year old patient presented for evaluation of lung status.  She has been a smoker. \par Her  passed away from lung cancer\par \par She stopped smoking 10 years ago\par \par She denies dyspnea, cough, wheeze, sputum production. 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  However there was mild scarring and calcified granulomas in the left lower lobe.\par \par Report as follows:\par \par \par FINDINGS:\par \par LUNGS/AIRWAYS/PLEURA: Patent trachea and bronchi. Mild scarring and adjacent calcified granulomas in the left lower lobe. No noncalcified lung nodule. Unremarkable pleura.\par \par LYMPH NODES/MEDIASTINUM: Calcified lymph nodes compatible with prior granulomatous disease. Small hiatal hernia. Limited evaluation of the thyroid due to low-dose technique/image noise, however suggestion of a 2.2 cm nodule in the left lobe.\par \par HEART/VASCULATURE: Normal heart size. Unremarkable pericardium. Coronary artery calcifications. Normal caliber aorta and pulmonary artery.\par \par UPPER ABDOMEN: Fatty liver. Cholelithiasis.\par \par \par IMPRESSION:\par \par No noncalcified lung nodule. Lung-RADS 1. Recommendation: Low dose chest CT in one year, if eligible.\par \par Suggestion of a 2.2 cm nodule in the left lobe of the thyroid. This is best evaluated with ultrasound.\par \par PSH:\par \par partial hysterectomy\par parotid tumor\par \par \par PMH:\par \par HTN\par \par cervical spine arthritis\par \par Warthin tumor resected\par \par trigeminal neuralgia\par \par Diabetes\par \par HLD\par \par phlebitis \par \par SH:\par \par \par former smoker\par \par ETOH:  occasional\par \par \par Occupation: retired, worked as cement factory\par \par has had  exposure to dust\par \par no pets\par \par ALLERGY:\par \par allergic to Keflex\par \par Reaction is rash\par \par environmental/seasonal allergy:  possible mild\par \par \par \par Review of Systems:\par \par No rash, skin problems\par \par no sinusitis, sinus infections, some nasal obstruction\par no dysphagia\par no dry mouth\par \par no pneumonia\par no wheeze\par no lung cancer\par \par no CAD\par no MI\par no chest pain\par no murmur\par no CHF\par no HTN\par has LE edema\par \par no peptic ulcer or gastritis\par no GERD\par no abdominal pain\par \par has fatty liver, resolved\par \par no thyroid disease\par \par no bleeding\par \par no DVT or PE\par \par no kidney disease\par \par no stroke\par no seizure\par \par She has had had pneumonia vaccine several years ago, not usre which one\par \par \par \par \par \par \par \par \par \par \par \par   [TextBox_11] : >1 [TextBox_13] : 40 [YearQuit] : 2013 [Snoring] : no snoring [Unintentional Sleep while Active] : no unintentional sleep while active [Witnessed Apneas] : no witnessed apneas

## 2023-03-06 NOTE — DISCUSSION/SUMMARY
[FreeTextEntry1] : 73 year old former smoker who denies symptoms of COPD\par \par She has smoked >30 pack years.  Stopped smoking 10 years ago\par \par She has HTN, DM\par \par She has a history of a positive tuberculin skin test.  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 This is best evaluated with ultrasound.\par There are calcified granulomas in the left lung consistent with likely LTBI\par \par She has had pneumonia vaccine\par \par \par \par PLAN\par \par Discussed significance and implications of LTBI as well as  role and risks/benefits of  potential treatment\par \par According to the periscope–TB risk predictor, the 2-year risk of TB is 0.4% range 0.2-0.8 without preventative treatment\par \par \par She is advised of potential for reactivation.  Regimens for treatment were discussed, as well as potential adverse reactions\par \par \par The patient is hesitant to take treatment for LTBI\par \par The patient will consider further.\par \par She is advised to undergo annual evaluation\par \par Given her prior history of smoking, she will be followed with annual low-dose CT scanning until she has been a non-smoker for 15 years\par \par She has had had pneumonia vaccine in 2018, will find out which vaccine as received\par \par Total time spent : 30 minutes\par Including:\par Preparation prior to visit - Reviewing prior record, results of tests and Consultation Reports as applicable\par Conducting an appropriate H & P during today's encounter\par Appropriate orders for tests, medications and procedures, as applicable\par Counseling patient \par Note completion \par Kang Ji MD

## 2023-03-06 NOTE — REASON FOR VISIT
[Follow-Up] : a follow-up visit [Latent TB/ +PPD/ +IGRA] : latent TB/ +PPD/ +IGRA [TextBox_44] :  history of smoking

## 2023-05-09 ENCOUNTER — APPOINTMENT (OUTPATIENT)
Dept: NEUROLOGY | Facility: CLINIC | Age: 73
End: 2023-05-09
Payer: MEDICAID

## 2023-05-09 DIAGNOSIS — M54.50 LOW BACK PAIN, UNSPECIFIED: ICD-10-CM

## 2023-05-09 PROCEDURE — 99214 OFFICE O/P EST MOD 30 MIN: CPT | Mod: 25

## 2023-05-09 PROCEDURE — 64616 CHEMODENERV MUSC NECK DYSTON: CPT

## 2023-05-09 NOTE — HISTORY OF PRESENT ILLNESS
[FreeTextEntry1] : This is a 72-year-old right-handed female who presents with chief complaints of rule out cervical dystonia and left sided trigeminal neuralgia. \par At this visit she is accompanied by her daughter Quita.  Patient is Monegasque speaking, her daughter is translating\par \par Patient states that at least 10 years ago she was diagnosed as having spinal spondylosis.  She has experienced pain in her neck more so on the right splenius capitis region.  She feels that the muscle contracts and limits her ability to turn to the right side.  She has no difficulty turning to the left.  She denies any head tremor.  She denies any tremors elsewhere.  Her symptoms get worse with prolonged standing.\par She also describes that the spasm in her neck travels to the back lower back and into the right leg.  She also has some low back pain.  Rarely she may experience jerking movement of her legs when she is trying to sleep.  This does not happen during the day.  She denies any restless leg-like symptoms.\par She has not been on any antipsychotic medications. She denies any numbness, weakness.\par Review of systems a complete review of systems is performed and is negative except as listed in HPI\par \par Saw Dr Singh, started flexeril, did not like it, stopped it. MRI showed left parotid adenoma, this has been removed. C-spine MRI showed stenosis.  \par ======================\par \par 1. Last Injected with botox 1//22. \par 2. She continues to get muscle spasms on the right side with toe curling. Can turn head a little better. No side effects. Walks with cane and feels unsteady\par 2. Still on oxcarbazepine 300 bid for facial pain. \par 3. Lower back pain stopped

## 2023-05-09 NOTE — PHYSICAL EXAM
[Person] : oriented to person [Place] : oriented to place [Time] : oriented to time [3+] : Patella left 3+ [2+] : Ankle jerk left 2+ [Dysdiadochokinesia Bilaterally] : not present [Coordination - Dysmetria Impaired Finger-to-Nose Bilateral] : not present [FreeTextEntry1] : On exam patient is awake and alert.  EOMI, no SWJ seen. \par \par Frequent neck irregular movement is seen. There is limitation of neck movement turning to the right it is better turning to the left. No clear torticollis except subtle right turn at times. Tone is normal\par \par Minimal postural tremors.  No resting tremor no intention tremor. No posturing of the hands of the feet is seen. Slow to get up from chair. Feels unsteady while walking without her cane

## 2023-05-09 NOTE — DISCUSSION/SUMMARY
[FreeTextEntry1] : This is a 72-year-old right-handed woman who presents with chief complaints of neck pain and limitation of movement.\par Differential includes muscle spasm due to spondylosis versus dystonia.\par Unclear etiology of lower leg symptom question radiculopathy.  I agree that there are no clear dystonic features except maybe the limitation on left turn.  Denies RLS\par \par 1. Physical therapy\par 2. Injected today per procedure note.\par 3. On Oxcarbazepine for trigeminal neuralgia. Just increased from 300 bid to tid (PMD is checking sodium), it was ok on 300 bid, but low on 600 bid \par 4. Repeat C-spine MRI and also get T-spine giving worsening of gait and pain and spasticity. \par \par We discussed the above impression, plan and recommendations during the visit. Counseling represented more then 50% of the 30 minute visit time

## 2023-05-22 ENCOUNTER — APPOINTMENT (OUTPATIENT)
Dept: OTOLARYNGOLOGY | Facility: CLINIC | Age: 73
End: 2023-05-22

## 2023-06-05 ENCOUNTER — APPOINTMENT (OUTPATIENT)
Dept: MRI IMAGING | Facility: CLINIC | Age: 73
End: 2023-06-05
Payer: MEDICAID

## 2023-06-05 ENCOUNTER — OUTPATIENT (OUTPATIENT)
Dept: OUTPATIENT SERVICES | Facility: HOSPITAL | Age: 73
LOS: 1 days | End: 2023-06-05
Payer: COMMERCIAL

## 2023-06-05 DIAGNOSIS — Z98.890 OTHER SPECIFIED POSTPROCEDURAL STATES: Chronic | ICD-10-CM

## 2023-06-05 DIAGNOSIS — Z00.8 ENCOUNTER FOR OTHER GENERAL EXAMINATION: ICD-10-CM

## 2023-06-05 DIAGNOSIS — G24.3 SPASMODIC TORTICOLLIS: ICD-10-CM

## 2023-06-05 DIAGNOSIS — Z90.711 ACQUIRED ABSENCE OF UTERUS WITH REMAINING CERVICAL STUMP: Chronic | ICD-10-CM

## 2023-06-05 DIAGNOSIS — G24.8 OTHER DYSTONIA: ICD-10-CM

## 2023-06-05 PROCEDURE — 72146 MRI CHEST SPINE W/O DYE: CPT | Mod: 26

## 2023-06-05 PROCEDURE — 72141 MRI NECK SPINE W/O DYE: CPT

## 2023-06-05 PROCEDURE — 72146 MRI CHEST SPINE W/O DYE: CPT

## 2023-06-05 PROCEDURE — 72141 MRI NECK SPINE W/O DYE: CPT | Mod: 26

## 2023-06-08 ENCOUNTER — NON-APPOINTMENT (OUTPATIENT)
Age: 73
End: 2023-06-08

## 2023-08-22 ENCOUNTER — APPOINTMENT (OUTPATIENT)
Dept: NEUROLOGY | Facility: CLINIC | Age: 73
End: 2023-08-22
Payer: MEDICAID

## 2023-08-22 DIAGNOSIS — G89.29 CERVICALGIA: ICD-10-CM

## 2023-08-22 DIAGNOSIS — M54.2 CERVICALGIA: ICD-10-CM

## 2023-08-22 PROCEDURE — 99214 OFFICE O/P EST MOD 30 MIN: CPT | Mod: 25

## 2023-08-22 NOTE — PROCEDURE
[FreeTextEntry1] : The patient received injections with botulinum toxin A (botox), diluted at 5 units/0.1 cc via a 30 ga needle into the following sites, in the usual antiseptic manner. Possible side effects including over-weakening, dysphagia, bleeding, and local infection were discussed.  1. Right splenius 175/7 - extended toward trap.  2. Left splenius 125/5  Total injected 300 units, waste 0 units, total used  300 units. The patient tolerated the procedure well, with no complications.

## 2023-08-22 NOTE — DISCUSSION/SUMMARY
[FreeTextEntry1] : This is a 73-year-old right-handed woman who presents with chief complaints of neck pain and limitation of movement. Differential includes muscle spasm due to spondylosis versus dystonia. Unclear etiology of lower leg symptom question radiculopathy.  I agree that there are no clear dystonic features except maybe the limitation on left turn.  Denies RLS  1. Physical therapy 2. Injected today per procedure note. 3. On Oxcarbazepine for trigeminal neuralgia. Just increased from 300 bid to tid (PMD is checking sodium), it was ok on 300 bid, but low on 600 bid  4. C-spine MRI showed stenosis, but exam normal. In my judgement, no clear role for surgery at this times  We discussed the above impression, plan and recommendations during the visit. Counseling represented more then 50% of the 30 minute visit time

## 2023-11-14 ENCOUNTER — APPOINTMENT (OUTPATIENT)
Dept: ORTHOPEDIC SURGERY | Facility: CLINIC | Age: 73
End: 2023-11-14
Payer: MEDICAID

## 2023-11-14 ENCOUNTER — NON-APPOINTMENT (OUTPATIENT)
Age: 73
End: 2023-11-14

## 2023-11-14 VITALS — BODY MASS INDEX: 39.87 KG/M2 | HEIGHT: 63 IN | WEIGHT: 225 LBS

## 2023-11-14 DIAGNOSIS — M25.562 PAIN IN LEFT KNEE: ICD-10-CM

## 2023-11-14 DIAGNOSIS — M17.0 BILATERAL PRIMARY OSTEOARTHRITIS OF KNEE: ICD-10-CM

## 2023-11-14 PROCEDURE — 73562 X-RAY EXAM OF KNEE 3: CPT | Mod: LT

## 2023-11-14 PROCEDURE — 99204 OFFICE O/P NEW MOD 45 MIN: CPT | Mod: 25

## 2023-11-14 PROCEDURE — 20610 DRAIN/INJ JOINT/BURSA W/O US: CPT | Mod: LT

## 2023-11-16 PROBLEM — M17.0 BILATERAL PRIMARY OSTEOARTHRITIS OF KNEE: Status: ACTIVE | Noted: 2023-11-16

## 2023-12-15 ENCOUNTER — APPOINTMENT (OUTPATIENT)
Dept: NEUROLOGY | Facility: CLINIC | Age: 73
End: 2023-12-15
Payer: MEDICAID

## 2023-12-15 DIAGNOSIS — G24.8 OTHER DYSTONIA: ICD-10-CM

## 2023-12-15 PROCEDURE — 99214 OFFICE O/P EST MOD 30 MIN: CPT | Mod: 25

## 2023-12-15 NOTE — DISCUSSION/SUMMARY
[FreeTextEntry1] : This is a 73-year-old right-handed woman who presents with chief complaints of neck pain and limitation of movement. Differential includes muscle spasm due to spondylosis versus dystonia. Unclear etiology of lower leg symptom.  I agree that there are no clear dystonic features except maybe the limitation on left turn.   1. Physical therapy 2. Injected today per procedure note. 3. On Oxcarbazepine for trigeminal neuralgia. Just increased from 300 bid to tid (PMD is checking sodium), it was ok on 300 bid, but low on 600 bid   We discussed the above impression, plan and recommendations during the visit. Counseling represented more then 50% of the 30 minute visit time

## 2023-12-15 NOTE — HISTORY OF PRESENT ILLNESS
[FreeTextEntry1] : This is a 73-year-old right-handed female who presents with chief complaints of rule out cervical dystonia and left sided trigeminal neuralgia.  At this visit she is accompanied by her daughter Quita.  Patient is Danish speaking, her daughter is translating  Patient states that at least 10 years ago she was diagnosed as having spinal spondylosis.  She has experienced pain in her neck more so on the right splenius capitis region.  She feels that the muscle contracts and limits her ability to turn to the right side.  She has no difficulty turning to the left.  She denies any head tremor.  She denies any tremors elsewhere.  Her symptoms get worse with prolonged standing. She also describes that the spasm in her neck travels to the back lower back and into the right leg.  Rarely she may experience jerking movement of her legs when she is trying to sleep.  This does not happen during the day.  She denies any restless leg-like symptoms. She has not been on any antipsychotic medications. She denies any numbness, weakness. Review of systems a complete review of systems is performed and is negative except as listed in HPI. Saw Dr Singh, started flexeril, did not like it, stopped it. MRI showed left parotid adenoma, this has been removed. C-spine MRI showed stenosis.   ======================  1. Last Injections with botox worked, symptoms now returning for past week or so (last injection was 4 months ago) 2. She continues to get muscle spasms on the right side with toe curling. Can turn head a little better. No side effects. Walks with cane and feels unsteady 3. Still on oxcarbazepine 300 bid for facial pain.

## 2024-03-26 ENCOUNTER — APPOINTMENT (OUTPATIENT)
Dept: NEUROLOGY | Facility: CLINIC | Age: 74
End: 2024-03-26
Payer: MEDICAID

## 2024-03-26 PROCEDURE — 64616 CHEMODENERV MUSC NECK DYSTON: CPT | Mod: 50

## 2024-03-26 PROCEDURE — 99214 OFFICE O/P EST MOD 30 MIN: CPT | Mod: 25

## 2024-03-26 NOTE — HISTORY OF PRESENT ILLNESS
[FreeTextEntry1] : This is a 73-year-old right-handed female who presents with chief complaints of rule out cervical dystonia and left sided trigeminal neuralgia.  At this visit she is accompanied by her daughter Quita.  Patient is Macedonian speaking, her daughter is translating  Patient states that at least 10 years ago she was diagnosed as having spinal spondylosis.  She has experienced pain in her neck more so on the right splenius capitis region.  She feels that the muscle contracts and limits her ability to turn to the right side.  She has no difficulty turning to the left.  She denies any head tremor.  She denies any tremors elsewhere.  Her symptoms get worse with prolonged standing. She also describes that the spasm in her neck travels to the back lower back and into the right leg.  Rarely she may experience jerking movement of her legs when she is trying to sleep.  This does not happen during the day.  She denies any restless leg-like symptoms. She has not been on any antipsychotic medications. She denies any numbness, weakness. Review of systems a complete review of systems is performed and is negative except as listed in HPI. Saw Dr Singh, started flexeril, did not like it, stopped it. MRI showed left parotid adenoma, this has been removed. C-spine MRI showed stenosis.   ======================  1. Last Injections with botox worked, symptoms now returning for past week or so (last injection was 4 months ago) 2. She continues to get muscle spasms on the face. 3. Still on oxcarbazepine 300 bid for facial pain.

## 2024-03-26 NOTE — DISCUSSION/SUMMARY
[FreeTextEntry1] : This is a 73-year-old right-handed woman who presents with chief complaints of neck pain and limitation of movement. Differential includes muscle spasm due to spondylosis versus dystonia. Unclear etiology of lower leg symptom.  I agree that there are no clear dystonic features except maybe the limitation on left turn.   1. Physical therapy 2. Injected today per procedure note. 3. On Oxcarbazepine for trigeminal neuralgia. 300 bid, tid if needed (PMD is checking sodium).  We discussed the above impression, plan and recommendations during the visit. Counseling represented more then 50% of the 30 minute visit time

## 2024-03-27 ENCOUNTER — APPOINTMENT (OUTPATIENT)
Dept: ORTHOPEDIC SURGERY | Facility: CLINIC | Age: 74
End: 2024-03-27
Payer: MEDICAID

## 2024-03-27 VITALS — WEIGHT: 220 LBS | HEIGHT: 63 IN | BODY MASS INDEX: 38.98 KG/M2

## 2024-03-27 DIAGNOSIS — M17.12 UNILATERAL PRIMARY OSTEOARTHRITIS, LEFT KNEE: ICD-10-CM

## 2024-03-27 PROCEDURE — 99213 OFFICE O/P EST LOW 20 MIN: CPT | Mod: 25

## 2024-03-27 PROCEDURE — 20610 DRAIN/INJ JOINT/BURSA W/O US: CPT | Mod: LT

## 2024-04-08 ENCOUNTER — APPOINTMENT (OUTPATIENT)
Dept: PULMONOLOGY | Facility: CLINIC | Age: 74
End: 2024-04-08
Payer: MEDICAID

## 2024-04-08 VITALS
DIASTOLIC BLOOD PRESSURE: 84 MMHG | HEART RATE: 76 BPM | TEMPERATURE: 97.4 F | SYSTOLIC BLOOD PRESSURE: 160 MMHG | WEIGHT: 231 LBS | BODY MASS INDEX: 40.92 KG/M2 | OXYGEN SATURATION: 95 %

## 2024-04-08 DIAGNOSIS — R76.12 NONSPECIFIC REACTION TO CELL MEDIATED IMMUNITY MEASUREMENT OF GAMMA INTERFERON ANTIGEN RESPONSE W/OUT ACTIVE TUBERCULOSIS: ICD-10-CM

## 2024-04-08 PROCEDURE — 99214 OFFICE O/P EST MOD 30 MIN: CPT

## 2024-04-08 NOTE — DISCUSSION/SUMMARY
[FreeTextEntry1] : 73 year old former smoker who denies symptoms of COPD  She has smoked >30 pack years.  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  She believes she has obtained BCG in the past.  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 This is best evaluated with ultrasound.  She states she has had a biopsy f this in [place].  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 She as decided to forego treatment for LTBI.   She has had pneumonia vaccine  PLAN  Discussed significance and implications of LTBI as well as  role and risks/benefits of  potential treatment  According to the periscope-TB risk predictor, the 2-year risk of TB is 0.4% range 0.2-0.8 without preventative treatment   She is advised of potential for reactivation.  Regimens for treatment were discussed, as well as potential adverse reactions  She is advised of warning signs for reactivation TB  Daughter is present  She requires annual LDCT chest given  history of  smoking,  until she has been a non-smoker for 15 years  She has had had pneumonia vaccine in 2018, will find out which vaccine as received  Total time spent : 30 minutes Including: Preparation prior to visit - Reviewing prior record, results of tests and Consultation Reports as applicable Conducting an appropriate H & P during today's encounter Appropriate orders for tests, medications and procedures, as applicable Counseling patient  Note completion  Kang Ji MD

## 2024-05-16 ENCOUNTER — NON-APPOINTMENT (OUTPATIENT)
Age: 74
End: 2024-05-16

## 2024-05-16 ENCOUNTER — RX RENEWAL (OUTPATIENT)
Age: 74
End: 2024-05-16

## 2024-05-16 VITALS — HEIGHT: 63 IN | BODY MASS INDEX: 40.93 KG/M2 | WEIGHT: 231 LBS

## 2024-05-16 DIAGNOSIS — Z87.891 PERSONAL HISTORY OF NICOTINE DEPENDENCE: ICD-10-CM

## 2024-05-16 NOTE — HISTORY OF PRESENT ILLNESS
[Former] : Former [TextBox_13] : Referred by Dr. Ji  Ms. RENE MARIE is a 74 year old woman with a history of nicotine dependence  Reviewed and confirmed that the patient meets screening eligibility criteria:  Smoking status:  -Former smoker  -Number of pack(s) per day: 1  -Number of years smoked: 40  -Number of pack years smokin  -Number of years since quitting smokin  -Quit year:    Ms. MARIE denies any personal history of lung cancer. Denies any s/s of lung cancer. H/o of lung cancer in uncle. Denies any history of lung disease. Denies any history of occupational exposures  [YearQuit] : 2013 [PacksperYear] : 40

## 2024-05-29 ENCOUNTER — OUTPATIENT (OUTPATIENT)
Dept: OUTPATIENT SERVICES | Facility: HOSPITAL | Age: 74
LOS: 1 days | End: 2024-05-29
Payer: COMMERCIAL

## 2024-05-29 ENCOUNTER — APPOINTMENT (OUTPATIENT)
Dept: CT IMAGING | Facility: IMAGING CENTER | Age: 74
End: 2024-05-29
Payer: MEDICAID

## 2024-05-29 DIAGNOSIS — R76.12 NONSPECIFIC REACTION TO CELL MEDIATED IMMUNITY MEASUREMENT OF GAMMA INTERFERON ANTIGEN RESPONSE WITHOUT ACTIVE TUBERCULOSIS: ICD-10-CM

## 2024-05-29 DIAGNOSIS — Z98.890 OTHER SPECIFIED POSTPROCEDURAL STATES: Chronic | ICD-10-CM

## 2024-05-29 DIAGNOSIS — Z90.711 ACQUIRED ABSENCE OF UTERUS WITH REMAINING CERVICAL STUMP: Chronic | ICD-10-CM

## 2024-05-29 PROCEDURE — 71271 CT THORAX LUNG CANCER SCR C-: CPT | Mod: 26

## 2024-05-29 PROCEDURE — 71271 CT THORAX LUNG CANCER SCR C-: CPT

## 2024-06-06 ENCOUNTER — NON-APPOINTMENT (OUTPATIENT)
Age: 74
End: 2024-06-06

## 2024-06-19 NOTE — CONSULT LETTER
Rounded pt, sleeping in bed, not in respiratory distress   [Dear  ___] : Dear  [unfilled], [Consult Letter:] : I had the pleasure of evaluating your patient, [unfilled]. [Please see my note below.] : Please see my note below. [Consult Closing:] : Thank you very much for allowing me to participate in the care of this patient.  If you have any questions, please do not hesitate to contact me. [Sincerely,] : Sincerely, [FreeTextEntry2] : Linwood Montilla MD ([place], [place]) [FreeTextEntry3] : Kevin Avendano MD

## 2024-06-25 ENCOUNTER — APPOINTMENT (OUTPATIENT)
Dept: NEUROLOGY | Facility: CLINIC | Age: 74
End: 2024-06-25

## 2024-06-25 DIAGNOSIS — G24.3 SPASMODIC TORTICOLLIS: ICD-10-CM

## 2024-06-25 DIAGNOSIS — G50.0 TRIGEMINAL NEURALGIA: ICD-10-CM

## 2024-06-25 PROCEDURE — 99214 OFFICE O/P EST MOD 30 MIN: CPT | Mod: 25

## 2024-06-25 PROCEDURE — 64616 CHEMODENERV MUSC NECK DYSTON: CPT

## 2024-06-25 RX ORDER — OXCARBAZEPINE 300 MG/1
300 TABLET, FILM COATED ORAL
Qty: 180 | Refills: 3 | Status: ACTIVE | COMMUNITY
Start: 2021-03-09 | End: 1900-01-01

## 2024-09-24 NOTE — CONSULT LETTER
[Dear  ___] : Dear  [unfilled], [Consult Letter:] : I had the pleasure of evaluating your patient, [unfilled]. [Please see my note below.] : Please see my note below. [Consult Closing:] : Thank you very much for allowing me to participate in the care of this patient.  If you have any questions, please do not hesitate to contact me. [Sincerely,] : Sincerely, [FreeTextEntry2] : Tejinder Maciel MD [FreeTextEntry3] : Radha Dempsey MD Breast Surgeon Division of Surgical Oncology Department of Surgery [address] [place] [address] Tel: [phone] Fax: [phone] Email: [email or web address]

## 2024-09-24 NOTE — ASSESSMENT
[FreeTextEntry1] : This is a 74 year-old F referred by Dr. 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  We discussed her situation at length in the office today with the patient. First we discussed her histopathology and biomarkers in terms of prognosis and adjuvant therapy. We discussed surgical options including mastectomy versus lumpectomy. She understands that she would not get a better outcome or longer survival with the mastectomy, although risk of local recurrence is lower. After breast conserving surgery, she may see asymmetry in size. She understands that radiation therapy and postoperative evaluation by Medical Oncology are integral parts of breast-conserving treatment and these will be addressed postoperatively. If we proceed with mastectomy, there is a still a chance that radiation will be recommended but less likely.   If we proceed with lumpectomy, she understands that it will be important to obtain clear margins and there is a 5-10% risk of having to go back for additional tissue. With mastectomy, there is still a small amount of breast tissue (probably on the order of 5%) that remains which will continue to require yearly clinical examination.   With mastectomy, she can have reconstruction done at the same time. We briefly discussed different options.     We discussed the role of lymph node surgery. This will confirm the stage and extent of disease. The risk of lymphedema is 15% with an axillary lymph node dissection.     The risks of surgery include bleeding, infection, scarring, numbness, possible discrepancy in breast size with lumpectomy/reconstructed breast. At the time of lumpectomy, a pre-operative localization of the lesion is required.  Exam today______  PLAN: -Breast MRI

## 2024-09-24 NOTE — HISTORY OF PRESENT ILLNESS
[FreeTextEntry1] : This is a 74 year-old F referred by Dr. 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  Prior history:  Recent imagin2024 B/L SM (MSR) NCI Risk 8.1% revealed SFGD -L upper central distortion--> L DM/US -R neg -BR0  2024 B/L US (MSR) -R 6:00 N1 4 mm probable cyst, stable since 2022 consistent w/ benign process & adjacent 3 mm probable debris-containing cyst -R RA 2 mm debris-containing cyst, stable -R 8:00 N2 8 mm hypoechoic lesion w/ questioned cortial irregularity--> targeted US -L 6:00 periareolar 5 mm cyst -BR0  2024 L DM (MSR) revealed SFGD -L upper central 9 mm persistent irregular/spiculated mass -BR4  2024 B/L US (MSR) -R 8:00 N2 9 mm elongated nodule--> USG-CNB -L 11:30-12:00 N7-8 8 mm irregular mass corresponding to L spiculated mammographic mass--> USG-CNB -BR4  2024 USG-CNB (MSR)**************PROCEDURAL REPORT PENDING******************** -R 8:00 N2 (INSERT CLIPPPP): benign *INSERT CONCORDANCE____ -L 11:30-12:00 N7-8 (INSERT CLIPPP): IDC, G1, ER , [place] , HER2 1+, Ki67: 5%  PMH:   PSH:   Meds:   ALL:   SH:   FH:  GYN: Menarche . Menopause. GP. Age of first full-term pregnancy. Breast-feeding. OCP . Fertility . HRT .

## 2024-09-24 NOTE — DATA REVIEWED
[FreeTextEntry1] : 8/17/2024 B/L SM 8/17/2024 B/L US  9/9/2024 L DM 9/9/2024 B/L US 9/19/2024 USG-CNB

## 2024-09-27 ENCOUNTER — APPOINTMENT (OUTPATIENT)
Dept: SURGICAL ONCOLOGY | Facility: CLINIC | Age: 74
End: 2024-09-27
Payer: MEDICAID

## 2024-09-27 ENCOUNTER — NON-APPOINTMENT (OUTPATIENT)
Age: 74
End: 2024-09-27

## 2024-09-27 DIAGNOSIS — C50.912 MALIGNANT NEOPLASM OF UNSPECIFIED SITE OF LEFT FEMALE BREAST: ICD-10-CM

## 2024-09-27 DIAGNOSIS — Z80.6 FAMILY HISTORY OF LEUKEMIA: ICD-10-CM

## 2024-09-27 DIAGNOSIS — Z78.9 OTHER SPECIFIED HEALTH STATUS: ICD-10-CM

## 2024-09-27 PROCEDURE — 99205 OFFICE O/P NEW HI 60 MIN: CPT

## 2024-09-27 NOTE — PHYSICAL EXAM
[Normocephalic] : normocephalic [Atraumatic] : atraumatic [EOMI] : extra ocular movement intact [PERRL] : pupils equal, round and reactive to light [Sclera nonicteric] : sclera nonicteric [Supple] : supple [No Supraclavicular Adenopathy] : no supraclavicular adenopathy [Examined in the supine and seated position] : examined in the supine and seated position [Bra Size: ___] : Bra Size: [unfilled] [Grade 3] : Ptosis Grade 3 [No dominant masses] : no dominant masses in right breast  [No dominant masses] : no dominant masses left breast [No Nipple Retraction] : no left nipple retraction [No Nipple Discharge] : no left nipple discharge [Breast Mass Right Breast ___cm] : no masses [Breast Mass Left Breast ___cm] : no masses [Breast Nipple Inversion] : nipples not inverted [Breast Nipple Retraction] : nipples not retracted [Breast Nipple Flattening] : nipples not flattened [Breast Nipple Fissures] : nipples not fissured [No Axillary Lymphadenopathy] : no left axillary lymphadenopathy [No Edema] : no edema [No Rashes] : no rashes [No Ulceration] : no ulceration [de-identified] : non-labored respirations  [de-identified] : 2 cm hematoma with ecchymosis on superior central breast, approx 12 cm from nipple

## 2024-09-27 NOTE — CONSULT LETTER
[FreeTextEntry2] : Tejinder Maciel MD [FreeTextEntry3] : Radha Dempsey MD Breast Surgeon Division of Surgical Oncology Department of Surgery [address] [place] [address] Tel: [phone] Fax: [phone] Email: [email or web address]

## 2024-09-27 NOTE — ASSESSMENT
[FreeTextEntry1] : This is a 74 year-old F referred by Dr. 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  We discussed her situation at length in the office today with the patient and her daughter. First we discussed her histopathology and biomarkers in terms of prognosis and adjuvant therapy. We discussed surgical options including mastectomy versus lumpectomy. She understands that she would not get a better outcome or longer survival with the mastectomy, although risk of local recurrence is lower. After breast conserving surgery, she may see asymmetry in size. She understands that radiation therapy and postoperative evaluation by Medical Oncology are integral parts of breast-conserving treatment and these will be addressed postoperatively. If we proceed with mastectomy, there is a still a chance that radiation will be recommended but less likely.   If we proceed with lumpectomy, she understands that it will be important to obtain clear margins and there is a 5-10% risk of having to go back for additional tissue. With mastectomy, there is still a small amount of breast tissue (probably on the order of 5%) that remains which will continue to require yearly clinical examination.  With mastectomy, she can have reconstruction done at the same time. We briefly discussed different options. She is not interested.   The risks of surgery include bleeding, infection, scarring, numbness, possible discrepancy in breast size with lumpectomy/reconstructed breast.  At the time of lumpectomy, a pre-operative localization of the lesion is required.   An MRI is recommended for further evaluation of disease extent and the possible presence of multicentric or contralateral disease.      We discussed the role of sentinel node biopsy. This will confirm the stage and extent of disease. The risk of lymphedema is 5% with a sentinel lymph node biopsy and 15% with an axillary lymph node dissection. Endocrine therapy is standard for all patients with hormone receptor positive disease. The omission of sentinel lymph node biopsy in clinically node negative women >=70 years of age treated with endocrine therapy does not result in increased rates of locoregional recurrence and does not impact breast cancer mortality. Patients >= 70 years of age with early stage hormone receptor positive, HER2 negative breast cancer and no palpable axillary lymph nodes can be safely treated without axillary staging.    Preoperative, intraoperative, and postoperative considerations were reviewed including anesthetic management and what she can expect when she is recovering from surgery. Risks, benefits, alternatives, and various management options were discussed with the patient.  We discussed the risks, benefits and limitations, and implications of genetic testing. We also discussed the psychosocial implications of genetic testing. Possible test results were reviewed along with associated medical management options. An iPad detailing informed consent was shown to the patient. Ms Laurent consented to genetic testing and blood was drawn and sent to [facility] today.     Ms Laurent verbalizes her understanding of the procedure and the risks/benefits/complications and alternatives were discussed questions were answered. She knows to call me if she has any additional questions or concerns.    PLAN: -Breast MRI - GT sent today - Tentatively for Left breast lumpectomy with MagSeed localization (1 site). omit SLNB

## 2024-09-27 NOTE — PAST MEDICAL HISTORY
[Postmenopausal] : The patient is postmenopausal [Menarche Age ____] : age at menarche was [unfilled] [Menopause Age____] : age at menopause was [unfilled] [History of Hormone Replacement Treatment] : has no history of hormone replacement treatment [Total Preg ___] : G[unfilled] [Live Births ___] : P[unfilled]  [AB Spont ___] : miscarriages: [unfilled]  [Age At Live Birth ___] : Age at live birth: [unfilled] [FreeTextEntry6] : none [FreeTextEntry7] : 5 years [FreeTextEntry8] : 5 years

## 2024-09-27 NOTE — HISTORY OF PRESENT ILLNESS
[FreeTextEntry1] : This is a 74 year-old F referred by Dr. Tejinder Maciel for evaluation of L IDC, G1, ER , SD , HER2 1+, Ki67: 5%, here for initial consultation.  She is accompanied by her daughter Quita. The patient speaks [place]serr[place]ian/[place] Leonean but understands some English. Her daughter helped with translation where needed.  The patient reports that she started mammography approximately 3 years ago when she arrived to this country.  She denies any prior abnormal imaging requiring a biopsy.  She denies any breast symptoms prior to her imaging this year.  Denies any masses, skin changes or nipple discharge.  Recent imagin2024 B/L SM (MSR) NCI Risk 8.1% revealed SFGD -L upper central distortion--> L DM/US -R neg -BR0  2024 B/L US (MSR) -R 6:00 N1 4 mm probable cyst, stable since 2022 consistent w/ benign process & adjacent 3 mm probable debris-containing cyst -R RA 2 mm debris-containing cyst, stable -R 8:00 N2 8 mm hypoechoic lesion w/ questioned cortial irregularity--> targeted US -L 6:00 periareolar 5 mm cyst -BR0  2024 L DM (MSR) revealed SFGD -L upper central 9 mm persistent irregular/spiculated mass -BR4  2024 B/L US (MSR) -R 8:00 N2 9 mm elongated nodule--> USG-CNB -L 11:30-12:00 N7-8 8 mm irregular mass corresponding to L spiculated mammographic mass--> USG-CNB -BR4  2024 USG-CNB (MSR) -R 8:00 N2 (ribbon): benign, concordant -L 11:30-12:00 N7-8 (ribbon): IDC, G1, ER , SD , HER2 1+, Ki67: 5%  PMH:  HTN, DM, cervical spine pain, trigeminal nerve pain PSH: Partial hysterectomy .  Parotid mass excision  Warthin's tumor.  Varicose vein .  Varicose phlebitis surgery  Meds: Oxcarbazepine, diazepam, alendronate, irbesartan, metoprolol, metformin, hydrochlorothiazide, rosuvastatin, fenofibrate ALL: Keflex SH: Former tobacco.  1 pack/day x 30 years.  Quit .  No EtOH FH: Sister with breast atypia.  No breast cancer in the family.  Mother with leukemia. GYN: Menarche 16.  Menopause 44.  G4, P2, 2 miscarriages.  Age of first full-term pregnancy 26.  Breast-feeding 3 years.  OCP 5 years.  Fertility none.  HRT none.

## 2024-10-03 ENCOUNTER — APPOINTMENT (OUTPATIENT)
Dept: MRI IMAGING | Facility: IMAGING CENTER | Age: 74
End: 2024-10-03

## 2024-10-03 PROCEDURE — 77049 MRI BREAST C-+ W/CAD BI: CPT | Mod: 26

## 2024-10-04 ENCOUNTER — APPOINTMENT (OUTPATIENT)
Dept: NEUROLOGY | Facility: CLINIC | Age: 74
End: 2024-10-04

## 2024-10-07 DIAGNOSIS — R59.9 ENLARGED LYMPH NODES, UNSPECIFIED: ICD-10-CM

## 2024-10-07 DIAGNOSIS — R92.8 OTHER ABNORMAL AND INCONCLUSIVE FINDINGS ON DIAGNOSTIC IMAGING OF BREAST: ICD-10-CM

## 2024-10-07 DIAGNOSIS — K76.9 LIVER DISEASE, UNSPECIFIED: ICD-10-CM

## 2024-10-09 ENCOUNTER — NON-APPOINTMENT (OUTPATIENT)
Age: 74
End: 2024-10-09

## 2024-10-10 ENCOUNTER — RESULT REVIEW (OUTPATIENT)
Age: 74
End: 2024-10-10

## 2024-10-10 ENCOUNTER — APPOINTMENT (OUTPATIENT)
Dept: MRI IMAGING | Facility: CLINIC | Age: 74
End: 2024-10-10

## 2024-10-10 DIAGNOSIS — N63.20 UNSPECIFIED LUMP IN THE LEFT BREAST, UNSPECIFIED QUADRANT: ICD-10-CM

## 2024-10-10 PROCEDURE — A9585: CPT

## 2024-10-10 PROCEDURE — 74183 MRI ABD W/O CNTR FLWD CNTR: CPT

## 2024-10-17 ENCOUNTER — RESULT REVIEW (OUTPATIENT)
Age: 74
End: 2024-10-17

## 2024-10-17 ENCOUNTER — APPOINTMENT (OUTPATIENT)
Dept: ULTRASOUND IMAGING | Facility: IMAGING CENTER | Age: 74
End: 2024-10-17
Payer: MEDICAID

## 2024-10-17 ENCOUNTER — OUTPATIENT (OUTPATIENT)
Dept: OUTPATIENT SERVICES | Facility: HOSPITAL | Age: 74
LOS: 1 days | End: 2024-10-17
Payer: COMMERCIAL

## 2024-10-17 DIAGNOSIS — N63.20 UNSPECIFIED LUMP IN THE LEFT BREAST, UNSPECIFIED QUADRANT: ICD-10-CM

## 2024-10-17 DIAGNOSIS — Z90.711 ACQUIRED ABSENCE OF UTERUS WITH REMAINING CERVICAL STUMP: Chronic | ICD-10-CM

## 2024-10-17 DIAGNOSIS — Z98.890 OTHER SPECIFIED POSTPROCEDURAL STATES: Chronic | ICD-10-CM

## 2024-10-17 PROCEDURE — 88305 TISSUE EXAM BY PATHOLOGIST: CPT | Mod: 26

## 2024-10-17 PROCEDURE — 77065 DX MAMMO INCL CAD UNI: CPT | Mod: 26,LT

## 2024-10-17 PROCEDURE — 88305 TISSUE EXAM BY PATHOLOGIST: CPT

## 2024-10-17 PROCEDURE — 38505 NEEDLE BIOPSY LYMPH NODES: CPT

## 2024-10-17 PROCEDURE — A4648: CPT

## 2024-10-17 PROCEDURE — 38505 NEEDLE BIOPSY LYMPH NODES: CPT | Mod: LT

## 2024-10-17 PROCEDURE — 76942 ECHO GUIDE FOR BIOPSY: CPT | Mod: 26

## 2024-10-17 PROCEDURE — 77065 DX MAMMO INCL CAD UNI: CPT

## 2024-10-17 PROCEDURE — 76942 ECHO GUIDE FOR BIOPSY: CPT

## 2024-10-21 ENCOUNTER — OUTPATIENT (OUTPATIENT)
Dept: OUTPATIENT SERVICES | Facility: HOSPITAL | Age: 74
LOS: 1 days | End: 2024-10-21
Payer: COMMERCIAL

## 2024-10-21 ENCOUNTER — APPOINTMENT (OUTPATIENT)
Dept: MRI IMAGING | Facility: IMAGING CENTER | Age: 74
End: 2024-10-21
Payer: MEDICAID

## 2024-10-21 ENCOUNTER — RESULT REVIEW (OUTPATIENT)
Age: 74
End: 2024-10-21

## 2024-10-21 DIAGNOSIS — Z98.890 OTHER SPECIFIED POSTPROCEDURAL STATES: Chronic | ICD-10-CM

## 2024-10-21 DIAGNOSIS — Z90.711 ACQUIRED ABSENCE OF UTERUS WITH REMAINING CERVICAL STUMP: Chronic | ICD-10-CM

## 2024-10-21 DIAGNOSIS — R92.8 OTHER ABNORMAL AND INCONCLUSIVE FINDINGS ON DIAGNOSTIC IMAGING OF BREAST: ICD-10-CM

## 2024-10-21 LAB — SURGICAL PATHOLOGY STUDY: SIGNIFICANT CHANGE UP

## 2024-10-21 PROCEDURE — 88305 TISSUE EXAM BY PATHOLOGIST: CPT | Mod: 26

## 2024-10-21 PROCEDURE — 77065 DX MAMMO INCL CAD UNI: CPT | Mod: 26,LT

## 2024-10-21 PROCEDURE — 19085 BX BREAST 1ST LESION MR IMAG: CPT | Mod: LT

## 2024-10-21 PROCEDURE — 88305 TISSUE EXAM BY PATHOLOGIST: CPT

## 2024-10-21 PROCEDURE — 77065 DX MAMMO INCL CAD UNI: CPT

## 2024-10-21 PROCEDURE — 19085 BX BREAST 1ST LESION MR IMAG: CPT

## 2024-10-22 ENCOUNTER — APPOINTMENT (OUTPATIENT)
Dept: NEUROLOGY | Facility: CLINIC | Age: 74
End: 2024-10-22
Payer: MEDICAID

## 2024-10-22 VITALS
HEIGHT: 63 IN | WEIGHT: 220 LBS | DIASTOLIC BLOOD PRESSURE: 84 MMHG | BODY MASS INDEX: 38.98 KG/M2 | HEART RATE: 71 BPM | SYSTOLIC BLOOD PRESSURE: 148 MMHG

## 2024-10-22 DIAGNOSIS — G50.0 TRIGEMINAL NEURALGIA: ICD-10-CM

## 2024-10-22 DIAGNOSIS — G24.3 SPASMODIC TORTICOLLIS: ICD-10-CM

## 2024-10-22 PROCEDURE — 64616 CHEMODENERV MUSC NECK DYSTON: CPT | Mod: 50

## 2024-10-22 PROCEDURE — 99214 OFFICE O/P EST MOD 30 MIN: CPT | Mod: 25

## 2024-10-28 DIAGNOSIS — C50.912 MALIGNANT NEOPLASM OF UNSPECIFIED SITE OF LEFT FEMALE BREAST: ICD-10-CM

## 2024-11-04 ENCOUNTER — OUTPATIENT (OUTPATIENT)
Dept: OUTPATIENT SERVICES | Facility: HOSPITAL | Age: 74
LOS: 1 days | End: 2024-11-04
Payer: COMMERCIAL

## 2024-11-04 ENCOUNTER — OUTPATIENT (OUTPATIENT)
Dept: OUTPATIENT SERVICES | Facility: HOSPITAL | Age: 74
LOS: 1 days | End: 2024-11-04

## 2024-11-04 ENCOUNTER — RESULT REVIEW (OUTPATIENT)
Age: 74
End: 2024-11-04

## 2024-11-04 VITALS
WEIGHT: 220.02 LBS | TEMPERATURE: 97 F | SYSTOLIC BLOOD PRESSURE: 170 MMHG | OXYGEN SATURATION: 98 % | HEART RATE: 65 BPM | HEIGHT: 62 IN | RESPIRATION RATE: 16 BRPM | DIASTOLIC BLOOD PRESSURE: 85 MMHG

## 2024-11-04 DIAGNOSIS — Z98.890 OTHER SPECIFIED POSTPROCEDURAL STATES: Chronic | ICD-10-CM

## 2024-11-04 DIAGNOSIS — K11.8 OTHER DISEASES OF SALIVARY GLANDS: Chronic | ICD-10-CM

## 2024-11-04 DIAGNOSIS — C50.912 MALIGNANT NEOPLASM OF UNSPECIFIED SITE OF LEFT FEMALE BREAST: ICD-10-CM

## 2024-11-04 DIAGNOSIS — Z90.711 ACQUIRED ABSENCE OF UTERUS WITH REMAINING CERVICAL STUMP: Chronic | ICD-10-CM

## 2024-11-04 DIAGNOSIS — E11.9 TYPE 2 DIABETES MELLITUS WITHOUT COMPLICATIONS: ICD-10-CM

## 2024-11-04 DIAGNOSIS — I10 ESSENTIAL (PRIMARY) HYPERTENSION: ICD-10-CM

## 2024-11-04 DIAGNOSIS — G50.0 TRIGEMINAL NEURALGIA: ICD-10-CM

## 2024-11-04 DIAGNOSIS — C80.1 MALIGNANT (PRIMARY) NEOPLASM, UNSPECIFIED: ICD-10-CM

## 2024-11-04 DIAGNOSIS — G47.33 OBSTRUCTIVE SLEEP APNEA (ADULT) (PEDIATRIC): ICD-10-CM

## 2024-11-04 LAB
A1C WITH ESTIMATED AVERAGE GLUCOSE RESULT: 6.1 % — HIGH (ref 4–5.6)
ANION GAP SERPL CALC-SCNC: 17 MMOL/L — HIGH (ref 7–14)
BUN SERPL-MCNC: 17 MG/DL — SIGNIFICANT CHANGE UP (ref 7–23)
CALCIUM SERPL-MCNC: 9.8 MG/DL — SIGNIFICANT CHANGE UP (ref 8.4–10.5)
CHLORIDE SERPL-SCNC: 95 MMOL/L — LOW (ref 98–107)
CO2 SERPL-SCNC: 21 MMOL/L — LOW (ref 22–31)
CREAT SERPL-MCNC: 0.72 MG/DL — SIGNIFICANT CHANGE UP (ref 0.5–1.3)
EGFR: 89 ML/MIN/1.73M2 — SIGNIFICANT CHANGE UP
ESTIMATED AVERAGE GLUCOSE: 128 — SIGNIFICANT CHANGE UP
GLUCOSE SERPL-MCNC: 73 MG/DL — SIGNIFICANT CHANGE UP (ref 70–99)
HCT VFR BLD CALC: 36.3 % — SIGNIFICANT CHANGE UP (ref 34.5–45)
HGB BLD-MCNC: 12.3 G/DL — SIGNIFICANT CHANGE UP (ref 11.5–15.5)
MCHC RBC-ENTMCNC: 31 PG — SIGNIFICANT CHANGE UP (ref 27–34)
MCHC RBC-ENTMCNC: 33.9 G/DL — SIGNIFICANT CHANGE UP (ref 32–36)
MCV RBC AUTO: 91.4 FL — SIGNIFICANT CHANGE UP (ref 80–100)
NRBC # BLD: 0 /100 WBCS — SIGNIFICANT CHANGE UP (ref 0–0)
NRBC # FLD: 0 K/UL — SIGNIFICANT CHANGE UP (ref 0–0)
PLATELET # BLD AUTO: 362 K/UL — SIGNIFICANT CHANGE UP (ref 150–400)
POTASSIUM SERPL-MCNC: 4.4 MMOL/L — SIGNIFICANT CHANGE UP (ref 3.5–5.3)
POTASSIUM SERPL-SCNC: 4.4 MMOL/L — SIGNIFICANT CHANGE UP (ref 3.5–5.3)
RBC # BLD: 3.97 M/UL — SIGNIFICANT CHANGE UP (ref 3.8–5.2)
RBC # FLD: 11.9 % — SIGNIFICANT CHANGE UP (ref 10.3–14.5)
SODIUM SERPL-SCNC: 133 MMOL/L — LOW (ref 135–145)
SURGICAL PATHOLOGY STUDY: SIGNIFICANT CHANGE UP
WBC # BLD: 6.73 K/UL — SIGNIFICANT CHANGE UP (ref 3.8–10.5)
WBC # FLD AUTO: 6.73 K/UL — SIGNIFICANT CHANGE UP (ref 3.8–10.5)

## 2024-11-04 PROCEDURE — 88321 CONSLTJ&REPRT SLD PREP ELSWR: CPT

## 2024-11-04 NOTE — H&P PST ADULT - ENDOCRINE COMMENTS
Type II DM- On Metformin, Last hgba1c 7. within 6 mnths 2024 Type II DM- On Metformin, Last Hgba1c unknown. FS not done,

## 2024-11-04 NOTE — H&P PST ADULT - PROBLEM SELECTOR PLAN 5
Outreach attempt #1 to introduce patient to care coordination and explain role of ACM. Patient was unable to reach, ACM left HIPAA compliant message with contact information. Plan   Outreach attempt #2 to introduce patient to care coordination and explain role of ACM.     [facility]  [phone]  [address] [place] [address]
sean precautions

## 2024-11-04 NOTE — H&P PST ADULT - PROBLEM SELECTOR PLAN 2
Instructed to take Metoprolol succ and HCTZ AM DOS and Irbesartan AM of surgery. Instructed to take Metoprolol succ and HCTZ AM DOS and Irbesartan AM of surgery.  BMP done.

## 2024-11-04 NOTE — H&P PST ADULT - REASON FOR ADMISSION
Pt. appeared to be coping well.  Pt. was inquisitive and verbalized a developmentally appropriate understanding of procedure. " I'm having left breast lump removed and left sentinel lymph node bx"

## 2024-11-04 NOTE — H&P PST ADULT - NSICDXPASTMEDICALHX_GEN_ALL_CORE_FT
PAST MEDICAL HISTORY:  COVID-19 virus antibody negative 10/12/20    DDD (degenerative disc disease), cervical     DDD (degenerative disc disease), lumbar     H/O osteoporosis     Hyperlipidemia     Hypertension     Leg swelling     Malignant neoplasm of left breast     Osteoarthritis     Trigeminal neuralgia     Type II diabetes mellitus     Warthin's tumor left parotid     PAST MEDICAL HISTORY:  COVID-19 virus antibody negative 10/12/20    DDD (degenerative disc disease), cervical     DDD (degenerative disc disease), lumbar     H/O osteoporosis     Hyperlipidemia     Hypertension     Hyponatremia     Leg swelling     Malignant neoplasm of left breast     Osteoarthritis     Trigeminal neuralgia     Type II diabetes mellitus     Warthin's tumor left parotid

## 2024-11-04 NOTE — H&P PST ADULT - PROBLEM SELECTOR PLAN 1
Scheduled for Left breast lumpectomy with magseed localization (2 sites), Left sentinel lymph node biopsy on 11/8/24.  Pre-op instructions provided. Pt given verbal and written instructions with teach back on chlorhexidine shampoo and pepcid. Pt verbalized understanding with return demonstration. Scheduled for Left breast lumpectomy with magseed localization (2 sites), Left sentinel lymph node biopsy on 11/8/24.  Pre-op instructions provided. Pt given verbal and written instructions with teach back on chlorhexidine shampoo and pepcid. Pt verbalized understanding with return demonstration.  CBC, BMP & A1c done.  Patient has not had any labs done for over a year. Patient is on Oxcarbazepine which causes hyponatremia BMP done. Scheduled for Left breast lumpectomy with magseed localization (2 sites), Left sentinel lymph node biopsy on 11/8/24.  Pre-op instructions provided. Pt given verbal and written instructions with teach back on chlorhexidine shampoo and pepcid. Pt verbalized understanding with return demonstration.  CBC, BMP & A1c done.  Patient has not had any labs done for over 9 months ago. Patient is on Oxcarbazepine which causes hyponatremia BMP done.

## 2024-11-04 NOTE — H&P PST ADULT - NSICDXPASTSURGICALHX_GEN_ALL_CORE_FT
PAST SURGICAL HISTORY:  H/O varicose vein ligation 2006 left and 2001 right    History of partial hysterectomy benign mass, 1998    Mass of left parotid gland

## 2024-11-04 NOTE — H&P PST ADULT - HISTORY OF PRESENT ILLNESS
74 yr old female with h/o Trigeminal neuralgia, HTN, Type II DM, Hyperlipidemia, Osteoporosis presents to have PST eval for Left breast lumpectomy with magseed localization(2 sites), Left sentinel lymph node biopsy.  Patient was referred by Dr. Tejinder Maciel for evaluation of L IDC, G1, ER , [place] , HER2 1+, Ki67: 5%, ?  ?  The patient reports that she started mammography approximately 3 years ago when she arrived to this country. She denies any prior abnormal imaging requiring a biopsy.

## 2024-11-05 ENCOUNTER — RESULT REVIEW (OUTPATIENT)
Age: 74
End: 2024-11-05

## 2024-11-05 ENCOUNTER — APPOINTMENT (OUTPATIENT)
Dept: MAMMOGRAPHY | Facility: IMAGING CENTER | Age: 74
End: 2024-11-05
Payer: MEDICAID

## 2024-11-05 ENCOUNTER — OUTPATIENT (OUTPATIENT)
Dept: OUTPATIENT SERVICES | Facility: HOSPITAL | Age: 74
LOS: 1 days | End: 2024-11-05
Payer: COMMERCIAL

## 2024-11-05 DIAGNOSIS — C50.912 MALIGNANT NEOPLASM OF UNSPECIFIED SITE OF LEFT FEMALE BREAST: ICD-10-CM

## 2024-11-05 DIAGNOSIS — K11.8 OTHER DISEASES OF SALIVARY GLANDS: Chronic | ICD-10-CM

## 2024-11-05 DIAGNOSIS — Z90.711 ACQUIRED ABSENCE OF UTERUS WITH REMAINING CERVICAL STUMP: Chronic | ICD-10-CM

## 2024-11-05 PROBLEM — Z87.39 PERSONAL HISTORY OF OTHER DISEASES OF THE MUSCULOSKELETAL SYSTEM AND CONNECTIVE TISSUE: Chronic | Status: ACTIVE | Noted: 2024-11-04

## 2024-11-05 PROBLEM — E11.9 TYPE 2 DIABETES MELLITUS WITHOUT COMPLICATIONS: Chronic | Status: ACTIVE | Noted: 2024-11-04

## 2024-11-05 PROCEDURE — A4648: CPT

## 2024-11-05 PROCEDURE — 19281 PERQ DEVICE BREAST 1ST IMAG: CPT | Mod: LT

## 2024-11-05 PROCEDURE — 19282 PERQ DEVICE BREAST EA IMAG: CPT

## 2024-11-05 PROCEDURE — 19282 PERQ DEVICE BREAST EA IMAG: CPT | Mod: LT

## 2024-11-05 PROCEDURE — 19281 PERQ DEVICE BREAST 1ST IMAG: CPT

## 2024-11-08 ENCOUNTER — RESULT REVIEW (OUTPATIENT)
Age: 74
End: 2024-11-08

## 2024-11-08 ENCOUNTER — APPOINTMENT (OUTPATIENT)
Dept: SURGICAL ONCOLOGY | Facility: AMBULATORY SURGERY CENTER | Age: 74
End: 2024-11-08

## 2024-11-08 ENCOUNTER — APPOINTMENT (OUTPATIENT)
Dept: NUCLEAR MEDICINE | Facility: HOSPITAL | Age: 74
End: 2024-11-08

## 2024-11-08 ENCOUNTER — OUTPATIENT (OUTPATIENT)
Dept: OUTPATIENT SERVICES | Facility: HOSPITAL | Age: 74
LOS: 1 days | End: 2024-11-08
Payer: COMMERCIAL

## 2024-11-08 ENCOUNTER — APPOINTMENT (OUTPATIENT)
Dept: MAMMOGRAPHY | Facility: IMAGING CENTER | Age: 74
End: 2024-11-08

## 2024-11-08 ENCOUNTER — APPOINTMENT (OUTPATIENT)
Dept: NUCLEAR MEDICINE | Facility: IMAGING CENTER | Age: 74
End: 2024-11-08

## 2024-11-08 ENCOUNTER — TRANSCRIPTION ENCOUNTER (OUTPATIENT)
Age: 74
End: 2024-11-08

## 2024-11-08 ENCOUNTER — OUTPATIENT (OUTPATIENT)
Dept: OUTPATIENT SERVICES | Facility: HOSPITAL | Age: 74
LOS: 1 days | Discharge: HOME | End: 2024-11-08
Payer: MEDICAID

## 2024-11-08 VITALS
TEMPERATURE: 99 F | SYSTOLIC BLOOD PRESSURE: 170 MMHG | HEIGHT: 62 IN | HEART RATE: 66 BPM | WEIGHT: 220.02 LBS | OXYGEN SATURATION: 98 % | RESPIRATION RATE: 18 BRPM | DIASTOLIC BLOOD PRESSURE: 79 MMHG

## 2024-11-08 VITALS
DIASTOLIC BLOOD PRESSURE: 60 MMHG | OXYGEN SATURATION: 97 % | HEART RATE: 71 BPM | TEMPERATURE: 98 F | SYSTOLIC BLOOD PRESSURE: 139 MMHG

## 2024-11-08 DIAGNOSIS — Z00.8 ENCOUNTER FOR OTHER GENERAL EXAMINATION: ICD-10-CM

## 2024-11-08 DIAGNOSIS — Z98.890 OTHER SPECIFIED POSTPROCEDURAL STATES: Chronic | ICD-10-CM

## 2024-11-08 DIAGNOSIS — Z90.711 ACQUIRED ABSENCE OF UTERUS WITH REMAINING CERVICAL STUMP: Chronic | ICD-10-CM

## 2024-11-08 DIAGNOSIS — K11.8 OTHER DISEASES OF SALIVARY GLANDS: Chronic | ICD-10-CM

## 2024-11-08 DIAGNOSIS — C50.912 MALIGNANT NEOPLASM OF UNSPECIFIED SITE OF LEFT FEMALE BREAST: ICD-10-CM

## 2024-11-08 LAB — GLUCOSE BLDC GLUCOMTR-MCNC: 100 MG/DL — HIGH (ref 70–99)

## 2024-11-08 PROCEDURE — 88305 TISSUE EXAM BY PATHOLOGIST: CPT | Mod: 26

## 2024-11-08 PROCEDURE — 88360 TUMOR IMMUNOHISTOCHEM/MANUAL: CPT | Mod: 26

## 2024-11-08 PROCEDURE — 38792 RA TRACER ID OF SENTINL NODE: CPT | Mod: LT,59

## 2024-11-08 PROCEDURE — 19301 PARTIAL MASTECTOMY: CPT | Mod: LT,59

## 2024-11-08 PROCEDURE — 76098 X-RAY EXAM SURGICAL SPECIMEN: CPT

## 2024-11-08 PROCEDURE — 38900 IO MAP OF SENT LYMPH NODE: CPT | Mod: LT

## 2024-11-08 PROCEDURE — 38525 BIOPSY/REMOVAL LYMPH NODES: CPT | Mod: LT

## 2024-11-08 PROCEDURE — 88307 TISSUE EXAM BY PATHOLOGIST: CPT | Mod: 26

## 2024-11-08 RX ORDER — HYDROCHLOROTHIAZIDE 50 MG
1 TABLET ORAL
Refills: 0 | DISCHARGE

## 2024-11-08 RX ORDER — IBUPROFEN 200 MG
1 TABLET ORAL
Refills: 0 | DISCHARGE

## 2024-11-08 RX ORDER — METFORMIN HYDROCHLORIDE 500 MG/1
1 TABLET, EXTENDED RELEASE ORAL
Refills: 0 | DISCHARGE

## 2024-11-08 RX ORDER — METOPROLOL TARTRATE 50 MG
1 TABLET ORAL
Refills: 0 | DISCHARGE

## 2024-11-08 NOTE — ASU DISCHARGE PLAN (ADULT/PEDIATRIC) - FINANCIAL ASSISTANCE
[facility] provides services at a reduced cost to those who are determined to be eligible through [facility]’s financial assistance program. Information regarding [facility]’s financial assistance program can be found by going to [email or web address] or by calling [phone].

## 2024-11-08 NOTE — ASU PREOPERATIVE ASSESSMENT, ADULT (IPARK ONLY) - FALL HARM RISK - RISK INTERVENTIONS

## 2024-11-08 NOTE — BRIEF OPERATIVE NOTE - NSICDXBRIEFPREOP_GEN_ALL_CORE_FT
PRE-OP DIAGNOSIS:  Atypical ductal hyperplasia of left breast 08-Nov-2024 16:42:19  Avila Malagon  Invasive ductal carcinoma of left breast 08-Nov-2024 16:41:59  Avila Malagon

## 2024-11-08 NOTE — ASU DISCHARGE PLAN (ADULT/PEDIATRIC) - ASU DC SPECIAL INSTRUCTIONSFT
Diet: You may resume your regular diet  Activity: No heavy lifting (greater than 20 lbs for 6 weeks), you may resume your activity as tolerated  Wound care: You may begin showering with the dressing on. Take off outer dressing in 2 days. Allow steri strips to fall off on their own.   Pain: You may take alternate between Tylenol and Ibuprofen as needed for pain every 3 hours between the two.   Follow up: Follow up with your surgeon in 2 weeks.

## 2024-11-08 NOTE — BRIEF OPERATIVE NOTE - OPERATION/FINDINGS
Injection of isosulfan blue and 99mTc. Excision of axillary sentinal node as defined by hot node and dyed blue. Lumpectomy of 3 o'clock atypical ductal hyperplasia confirmed with marker seen on mammography.  lumpectomy at 1 o'clock of invasive ductal carcinoma with confirmation of clip seen on mammography. Margins taken. Closed with 3-0 Vicryl and 4-0 Monocryl.

## 2024-11-08 NOTE — ASU PREOP CHECKLIST - PATIENT PROBLEMS/NEEDS
----- Message from Gayle Suazo NP sent at 11/23/2022  2:27 PM CST -----  Please set up for an apt with Nicole for low fat diabetic diet for pancreatitis. Thanks!     Patient expressed no known problems or needs

## 2024-11-08 NOTE — BRIEF OPERATIVE NOTE - NSICDXBRIEFPROCEDURE_GEN_ALL_CORE_FT
PROCEDURES:  Lumpectomy, breast, left 08-Nov-2024 16:39:51  Avila Malagon  Excisional biopsy, sentinel lymph node, axillary, deep 08-Nov-2024 16:41:45  Avila Malagon

## 2024-11-08 NOTE — ASU DISCHARGE PLAN (ADULT/PEDIATRIC) - CARE PROVIDER_API CALL
Radha Dempsey)  Surgery  [address] [place] [id number]  Phone: [phone]  Fax: [phone]  Follow Up Time: 2 weeks

## 2024-11-08 NOTE — BRIEF OPERATIVE NOTE - NSICDXBRIEFPOSTOP_GEN_ALL_CORE_FT
POST-OP DIAGNOSIS:  Invasive ductal carcinoma of left breast 08-Nov-2024 16:42:33  Avila Malagon  Atypical ductal hyperplasia of left breast 08-Nov-2024 16:42:25  Avila Malagon

## 2024-11-08 NOTE — ASU DISCHARGE PLAN (ADULT/PEDIATRIC) - NURSING INSTRUCTIONS
Progress to regular diet as tolerated.  Keep well hydrated.     Next dose of Tylenol may be taken 10pm

## 2024-11-08 NOTE — ASU DISCHARGE PLAN (ADULT/PEDIATRIC) - NS MD DC FALL RISK RISK
For information on Fall & Injury Prevention, visit: [email or web address] OR  [email or web address] OR  https://www.cdc.gov/steadi/patient.html

## 2024-11-15 LAB — SURGICAL PATHOLOGY STUDY: SIGNIFICANT CHANGE UP

## 2024-11-18 PROBLEM — E87.1 HYPO-OSMOLALITY AND HYPONATREMIA: Chronic | Status: ACTIVE | Noted: 2024-11-04

## 2024-11-26 ENCOUNTER — APPOINTMENT (OUTPATIENT)
Dept: SURGICAL ONCOLOGY | Facility: CLINIC | Age: 74
End: 2024-11-26
Payer: MEDICAID

## 2024-11-26 ENCOUNTER — NON-APPOINTMENT (OUTPATIENT)
Age: 74
End: 2024-11-26

## 2024-11-26 VITALS
DIASTOLIC BLOOD PRESSURE: 100 MMHG | SYSTOLIC BLOOD PRESSURE: 195 MMHG | WEIGHT: 220 LBS | HEIGHT: 63 IN | BODY MASS INDEX: 38.98 KG/M2 | HEART RATE: 77 BPM

## 2024-11-26 DIAGNOSIS — C50.912 MALIGNANT NEOPLASM OF UNSPECIFIED SITE OF LEFT FEMALE BREAST: ICD-10-CM

## 2024-11-26 PROCEDURE — 99024 POSTOP FOLLOW-UP VISIT: CPT

## 2024-12-05 ENCOUNTER — APPOINTMENT (OUTPATIENT)
Dept: RADIATION ONCOLOGY | Facility: CLINIC | Age: 74
End: 2024-12-05
Payer: MEDICAID

## 2024-12-05 ENCOUNTER — OUTPATIENT (OUTPATIENT)
Dept: OUTPATIENT SERVICES | Facility: HOSPITAL | Age: 74
LOS: 1 days | Discharge: HOME | End: 2024-12-05

## 2024-12-05 VITALS
DIASTOLIC BLOOD PRESSURE: 79 MMHG | RESPIRATION RATE: 17 BRPM | HEIGHT: 63 IN | HEART RATE: 78 BPM | TEMPERATURE: 98.4 F | OXYGEN SATURATION: 98 % | SYSTOLIC BLOOD PRESSURE: 148 MMHG

## 2024-12-05 DIAGNOSIS — Z98.890 OTHER SPECIFIED POSTPROCEDURAL STATES: Chronic | ICD-10-CM

## 2024-12-05 DIAGNOSIS — K11.8 OTHER DISEASES OF SALIVARY GLANDS: Chronic | ICD-10-CM

## 2024-12-05 DIAGNOSIS — C50.912 MALIGNANT NEOPLASM OF UNSPECIFIED SITE OF LEFT FEMALE BREAST: ICD-10-CM

## 2024-12-05 DIAGNOSIS — Z90.711 ACQUIRED ABSENCE OF UTERUS WITH REMAINING CERVICAL STUMP: Chronic | ICD-10-CM

## 2024-12-05 PROCEDURE — 99205 OFFICE O/P NEW HI 60 MIN: CPT

## 2024-12-06 ENCOUNTER — OUTPATIENT (OUTPATIENT)
Dept: OUTPATIENT SERVICES | Facility: HOSPITAL | Age: 74
LOS: 1 days | Discharge: HOME | End: 2024-12-06
Payer: MEDICAID

## 2024-12-06 DIAGNOSIS — Z90.711 ACQUIRED ABSENCE OF UTERUS WITH REMAINING CERVICAL STUMP: Chronic | ICD-10-CM

## 2024-12-06 DIAGNOSIS — K11.8 OTHER DISEASES OF SALIVARY GLANDS: Chronic | ICD-10-CM

## 2024-12-06 DIAGNOSIS — Z98.890 OTHER SPECIFIED POSTPROCEDURAL STATES: Chronic | ICD-10-CM

## 2024-12-09 ENCOUNTER — NON-APPOINTMENT (OUTPATIENT)
Age: 74
End: 2024-12-09

## 2024-12-09 ENCOUNTER — APPOINTMENT (OUTPATIENT)
Dept: HEMATOLOGY ONCOLOGY | Facility: CLINIC | Age: 74
End: 2024-12-09
Payer: MEDICAID

## 2024-12-09 VITALS
TEMPERATURE: 97.3 F | WEIGHT: 223 LBS | HEIGHT: 63 IN | DIASTOLIC BLOOD PRESSURE: 74 MMHG | OXYGEN SATURATION: 97 % | SYSTOLIC BLOOD PRESSURE: 160 MMHG | RESPIRATION RATE: 16 BRPM | HEART RATE: 70 BPM | BODY MASS INDEX: 39.51 KG/M2

## 2024-12-09 DIAGNOSIS — C50.412 MALIGNANT NEOPLASM OF UPPER-OUTER QUADRANT OF LEFT FEMALE BREAST: ICD-10-CM

## 2024-12-09 DIAGNOSIS — C50.912 MALIGNANT NEOPLASM OF UNSPECIFIED SITE OF LEFT FEMALE BREAST: ICD-10-CM

## 2024-12-09 PROCEDURE — 99205 OFFICE O/P NEW HI 60 MIN: CPT

## 2024-12-09 PROCEDURE — G2211 COMPLEX E/M VISIT ADD ON: CPT | Mod: NC

## 2024-12-09 RX ORDER — ANASTROZOLE TABLETS 1 MG/1
1 TABLET ORAL
Qty: 30 | Refills: 2 | Status: ACTIVE | COMMUNITY
Start: 2024-12-09 | End: 1900-01-01

## 2024-12-12 ENCOUNTER — NON-APPOINTMENT (OUTPATIENT)
Age: 74
End: 2024-12-12

## 2024-12-12 PROCEDURE — 77333 RADIATION TREATMENT AID(S): CPT | Mod: 26,59

## 2024-12-12 PROCEDURE — 77290 THER RAD SIMULAJ FIELD CPLX: CPT | Mod: 26

## 2024-12-12 PROCEDURE — 77334 RADIATION TREATMENT AID(S): CPT | Mod: 26

## 2024-12-12 PROCEDURE — 77263 THER RADIOLOGY TX PLNG CPLX: CPT

## 2024-12-19 PROCEDURE — 77300 RADIATION THERAPY DOSE PLAN: CPT | Mod: 26

## 2024-12-19 PROCEDURE — 77334 RADIATION TREATMENT AID(S): CPT | Mod: 26

## 2024-12-19 PROCEDURE — 77295 3-D RADIOTHERAPY PLAN: CPT | Mod: 26

## 2025-01-04 PROCEDURE — 77280 THER RAD SIMULAJ FIELD SMPL: CPT | Mod: 26

## 2025-01-06 PROCEDURE — 77427 RADIATION TX MANAGEMENT X5: CPT

## 2025-01-06 PROCEDURE — G6017: CPT

## 2025-01-07 ENCOUNTER — APPOINTMENT (OUTPATIENT)
Dept: PHYSICAL MEDICINE AND REHAB | Facility: CLINIC | Age: 75
End: 2025-01-07

## 2025-01-07 PROCEDURE — G6017: CPT

## 2025-01-08 ENCOUNTER — NON-APPOINTMENT (OUTPATIENT)
Age: 75
End: 2025-01-08

## 2025-01-08 PROCEDURE — G6017: CPT

## 2025-01-10 PROCEDURE — G6017: CPT

## 2025-01-11 PROCEDURE — G6017: CPT

## 2025-01-15 ENCOUNTER — NON-APPOINTMENT (OUTPATIENT)
Age: 75
End: 2025-01-15

## 2025-01-22 ENCOUNTER — NON-APPOINTMENT (OUTPATIENT)
Age: 75
End: 2025-01-22

## 2025-02-04 ENCOUNTER — APPOINTMENT (OUTPATIENT)
Dept: NEUROLOGY | Facility: CLINIC | Age: 75
End: 2025-02-04

## 2025-02-10 ENCOUNTER — APPOINTMENT (OUTPATIENT)
Dept: SURGICAL ONCOLOGY | Facility: CLINIC | Age: 75
End: 2025-02-10
Payer: MEDICAID

## 2025-02-10 VITALS
SYSTOLIC BLOOD PRESSURE: 160 MMHG | DIASTOLIC BLOOD PRESSURE: 90 MMHG | HEART RATE: 72 BPM | HEIGHT: 63 IN | OXYGEN SATURATION: 97 % | BODY MASS INDEX: 39.51 KG/M2 | WEIGHT: 223 LBS

## 2025-02-10 DIAGNOSIS — C50.912 MALIGNANT NEOPLASM OF UNSPECIFIED SITE OF LEFT FEMALE BREAST: ICD-10-CM

## 2025-02-10 PROCEDURE — 99214 OFFICE O/P EST MOD 30 MIN: CPT

## 2025-02-11 ENCOUNTER — APPOINTMENT (OUTPATIENT)
Dept: ORTHOPEDIC SURGERY | Facility: CLINIC | Age: 75
End: 2025-02-11
Payer: MEDICAID

## 2025-02-11 VITALS — WEIGHT: 220 LBS | BODY MASS INDEX: 40.48 KG/M2 | HEIGHT: 62 IN

## 2025-02-11 DIAGNOSIS — M17.12 UNILATERAL PRIMARY OSTEOARTHRITIS, LEFT KNEE: ICD-10-CM

## 2025-02-11 PROCEDURE — 20610 DRAIN/INJ JOINT/BURSA W/O US: CPT | Mod: LT

## 2025-02-11 PROCEDURE — 99214 OFFICE O/P EST MOD 30 MIN: CPT | Mod: 25

## 2025-02-11 PROCEDURE — A4649 SURGICAL SUPPLIES: CPT

## 2025-02-12 NOTE — DIETITIAN INITIAL EVALUATION ADULT. - FEEDING SKILL
I have ordered the following medication. Please notify the patient.    Medications Ordered This Encounter   Medications    azithromycin (Z-PATRICIA) 250 MG tablet     Sig: Take 2 tablets by mouth on day 1; Take 1 tablet by mouth on days 2-5     Dispense:  6 tablet     Refill:  0      independent

## 2025-02-19 NOTE — H&P PST ADULT - MALLAMPATI CLASS
Class III - visualization of the soft palate and the base of the uvula Patient expressed no known problems or needs

## 2025-02-24 ENCOUNTER — RX RENEWAL (OUTPATIENT)
Age: 75
End: 2025-02-24

## 2025-02-27 ENCOUNTER — APPOINTMENT (OUTPATIENT)
Dept: RADIATION ONCOLOGY | Facility: CLINIC | Age: 75
End: 2025-02-27
Payer: MEDICAID

## 2025-02-27 VITALS
SYSTOLIC BLOOD PRESSURE: 155 MMHG | DIASTOLIC BLOOD PRESSURE: 85 MMHG | RESPIRATION RATE: 16 BRPM | HEART RATE: 68 BPM | BODY MASS INDEX: 42.68 KG/M2 | TEMPERATURE: 97.7 F | OXYGEN SATURATION: 96 % | HEIGHT: 62 IN | WEIGHT: 231.92 LBS

## 2025-02-27 DIAGNOSIS — C50.912 MALIGNANT NEOPLASM OF UNSPECIFIED SITE OF LEFT FEMALE BREAST: ICD-10-CM

## 2025-02-27 DIAGNOSIS — B35.4 TINEA CORPORIS: ICD-10-CM

## 2025-02-27 PROCEDURE — 99024 POSTOP FOLLOW-UP VISIT: CPT

## 2025-02-27 RX ORDER — NYSTATIN 100000 [USP'U]/G
100000 POWDER TOPICAL
Qty: 1 | Refills: 0 | Status: ACTIVE | COMMUNITY
Start: 2025-02-27 | End: 1900-01-01

## 2025-03-07 ENCOUNTER — OUTPATIENT (OUTPATIENT)
Dept: OUTPATIENT SERVICES | Facility: HOSPITAL | Age: 75
LOS: 1 days | Discharge: HOME | End: 2025-03-07

## 2025-03-07 DIAGNOSIS — Z98.890 OTHER SPECIFIED POSTPROCEDURAL STATES: Chronic | ICD-10-CM

## 2025-03-07 DIAGNOSIS — K11.8 OTHER DISEASES OF SALIVARY GLANDS: Chronic | ICD-10-CM

## 2025-03-07 DIAGNOSIS — C50.912 MALIGNANT NEOPLASM OF UNSPECIFIED SITE OF LEFT FEMALE BREAST: ICD-10-CM

## 2025-03-07 DIAGNOSIS — Z90.711 ACQUIRED ABSENCE OF UTERUS WITH REMAINING CERVICAL STUMP: Chronic | ICD-10-CM

## 2025-03-10 ENCOUNTER — APPOINTMENT (OUTPATIENT)
Dept: HEMATOLOGY ONCOLOGY | Facility: CLINIC | Age: 75
End: 2025-03-10
Payer: MEDICAID

## 2025-03-10 VITALS
DIASTOLIC BLOOD PRESSURE: 80 MMHG | SYSTOLIC BLOOD PRESSURE: 154 MMHG | TEMPERATURE: 97.5 F | RESPIRATION RATE: 6 BRPM | WEIGHT: 230.82 LBS | BODY MASS INDEX: 42.22 KG/M2 | HEART RATE: 67 BPM | OXYGEN SATURATION: 97 %

## 2025-03-10 DIAGNOSIS — C50.912 MALIGNANT NEOPLASM OF UNSPECIFIED SITE OF LEFT FEMALE BREAST: ICD-10-CM

## 2025-03-10 DIAGNOSIS — Z79.899 OTHER LONG TERM (CURRENT) DRUG THERAPY: ICD-10-CM

## 2025-03-10 DIAGNOSIS — Z78.0 OTHER SPECIFIED DISORDERS OF BONE DENSITY AND STRUCTURE, UNSPECIFIED SITE: ICD-10-CM

## 2025-03-10 DIAGNOSIS — M85.80 OTHER SPECIFIED DISORDERS OF BONE DENSITY AND STRUCTURE, UNSPECIFIED SITE: ICD-10-CM

## 2025-03-10 PROCEDURE — 99214 OFFICE O/P EST MOD 30 MIN: CPT

## 2025-03-19 ENCOUNTER — RX RENEWAL (OUTPATIENT)
Age: 75
End: 2025-03-19

## 2025-04-15 NOTE — H&P PST ADULT - BLOOD AVOIDANCE/RESTRICTIONS, PROFILE
Pain control: IF you can take tylenol and ibuprofen:  - take acetaminophen (tylenol) 1,000 mg every 8 hours x 4 days (do not take more than 4,000 mg in a 24 hour period)  - take ibuprofen 600 mg every 8 hours x 4 days. This can be taken at the same time as the acetaminophen or staggered  - take your pain medication prescription as needed  - you may stop taking these meds sooner if you feel you no longer need them        Discharge Instructions for Laparoscopic Cholecystectomy  You have had a procedure known as a laparoscopic cholecystectomy. A laparoscopic cholecystectomy is a procedure to remove your gallbladder. People who have this procedure usually recover more quickly and have less pain than with open gallbladder surgery (called open cholecystectomy). There is no need for a special diet after this surgery.  You can live a full and healthy life without your gallbladder. This includes eating the foods and doing the things you enjoyed before your gallbladder problems started.  Home Care  Ask someone to drive you to your appointments for the next 3-7 days. Don’t drive until you are no longer taking pain medication.  Use ice, 20 minutes on/ 20 minutes off, for the first 2 days (while awake).  Wash the skin around your incision daily with mild soap and water. It's okay to shower the day after your surgery.  Eat your regular diet. It is wise to stay away from rich, greasy, or spicy food for a few days.  Remember, it takes about 1-3 weeks for you to get most of your strength and energy back.  Don't be alarmed if you have discomfort in your shoulder and chest for up to 48 hours after surgery. This is caused by carbon dioxide (gas) used during the operation. The discomfort will go away.  Limit your lifting to 20 pounds for the first 2 weeks after surgery  Follow-Up  Follow-up appointment in the office with Dr. Benjamin or General Surgery Department Advance Practitioner as directed.      When to Call Your Doctor  Call your  doctor immediately if you have any of the following:  Yellowing of your eyes or skin (jaundice)  Chills  Fever above 100.0°F  Redness, swelling, increasing pain, pus, or a foul smell at the incision site  Dark or rust-colored urine  Stool that is laura-colored or light in color instead of brown  Increasing abdominal pain   © 5729-8186 [employer], [address], [place] [address]. All rights reserved. This information is not intended as a substitute for professional medical care. Always follow your healthcare professional's instructions.      [facility]  [address], [address]  [place] [address]    Phone: [phone]  Fax: [phone]  e[employer]    Home Instruction Sheet  ANESTHESIA for ADULT     What are the side effects?  Side effects depend on the medication used, and may not even be present.    Most Common Sometimes   Irritability  Poor Balance  Sleeping for Several Hours  Drowsiness  Fatigue  Difficulty Concentrating Change in Behavior  Hyperactivity  Nausea (upset stomach)  Gas (flatulence)  Dizziness  Hiccups  Constipation  Blurred Vision     The effects of sedation medicine can last up to 24 hours.  You may be drowsy or irritable for 2 to 8 hours after receiving medicine.  You may need to sleep after leaving the testing area.  Sleeping after sedation will help reduce irritability.  Diet:  Do not give anything to eat or drink until you are fully awake.  Eat a light meal and advance to a normal diet unless instructed differently:   Do not drink alcohol beverages for the next 24 hours.  Avoid greasy or spicy foods today.  Activity:  You may be dizzy and/or unsteady.  Ask for help walking, using the bathroom, or stairs to protect yourself from injury.  You should not drive a vehicle, operate machinery or power tools for 24 hours because your reflexes may be slow and your vision may be blurry.  Do not sign any legally binding  documents or make important decisions for 24 hours after receiving sedation.  Medications:   Unless told otherwise, do not take any non-prescription medications (cold medicine, etc.) for 24 hours, as these medications in combination with sedation medication, can cause increased drowsiness.  If you feel that you need over the counter medication, discuss with your physician.    If you are currently taking or are on Hormonal Contraceptives, these may be ineffective for the next 28 days following anesthesia due to interactions with medications that you may have received during surgery.  Please use additional (back up) contraception during this time.      Examples of hormonal Contraceptive:   -Birth Control Pills (oral)   -Birth Control Shots (injectable)   -Implantable contraceptives   -Patches   -Vaginal Rings    When Should I Call the Doctor?  Vomiting more than twice.  Extreme irritability or unusual changes in behavior.  Trouble arousing.  Inability to urinate.  Trouble breathing - call 911.    We would like to take this opportunity to thank you. Because your confidence in your caregivers is very important to us, it is our commitment to always treat you and your family with courtesy and respect. Our goal is to always answer any questions or worries or concerns you may have about the care you received.  If you have any suggestions for improvement or worries or concerns please do not hesitate to let us know.                                                                               Want to Say “Thank You” to a Nurse?  The CHARLENE Award® was created in memory of ZAHIDA Eden by his family to say thank you to bedside nurses who provide an outstanding level of care.    Submit a nomination using any method below.     OR    [email or web address]  Or visit the Resource section   on your M/A-COM cindy             none

## 2025-05-05 ENCOUNTER — NON-APPOINTMENT (OUTPATIENT)
Age: 75
End: 2025-05-05

## 2025-05-05 VITALS — BODY MASS INDEX: 42.33 KG/M2 | WEIGHT: 230 LBS | HEIGHT: 62 IN

## 2025-05-05 DIAGNOSIS — Z87.891 PERSONAL HISTORY OF NICOTINE DEPENDENCE: ICD-10-CM

## 2025-05-06 ENCOUNTER — APPOINTMENT (OUTPATIENT)
Dept: CT IMAGING | Facility: IMAGING CENTER | Age: 75
End: 2025-05-06
Payer: MEDICAID

## 2025-05-06 ENCOUNTER — APPOINTMENT (OUTPATIENT)
Dept: MRI IMAGING | Facility: IMAGING CENTER | Age: 75
End: 2025-05-06
Payer: MEDICAID

## 2025-05-06 ENCOUNTER — OUTPATIENT (OUTPATIENT)
Dept: OUTPATIENT SERVICES | Facility: HOSPITAL | Age: 75
LOS: 1 days | End: 2025-05-06
Payer: COMMERCIAL

## 2025-05-06 DIAGNOSIS — Z98.890 OTHER SPECIFIED POSTPROCEDURAL STATES: Chronic | ICD-10-CM

## 2025-05-06 DIAGNOSIS — C50.912 MALIGNANT NEOPLASM OF UNSPECIFIED SITE OF LEFT FEMALE BREAST: ICD-10-CM

## 2025-05-06 DIAGNOSIS — K11.8 OTHER DISEASES OF SALIVARY GLANDS: Chronic | ICD-10-CM

## 2025-05-06 DIAGNOSIS — Z90.711 ACQUIRED ABSENCE OF UTERUS WITH REMAINING CERVICAL STUMP: Chronic | ICD-10-CM

## 2025-05-06 DIAGNOSIS — Z87.891 PERSONAL HISTORY OF NICOTINE DEPENDENCE: ICD-10-CM

## 2025-05-06 PROCEDURE — 71271 CT THORAX LUNG CANCER SCR C-: CPT

## 2025-05-06 PROCEDURE — 74183 MRI ABD W/O CNTR FLWD CNTR: CPT

## 2025-05-06 PROCEDURE — 71271 CT THORAX LUNG CANCER SCR C-: CPT | Mod: 26

## 2025-05-06 PROCEDURE — 74183 MRI ABD W/O CNTR FLWD CNTR: CPT | Mod: 26

## 2025-05-15 ENCOUNTER — NON-APPOINTMENT (OUTPATIENT)
Age: 75
End: 2025-05-15

## 2025-05-15 DIAGNOSIS — R91.1 SOLITARY PULMONARY NODULE: ICD-10-CM

## 2025-06-02 ENCOUNTER — RX RENEWAL (OUTPATIENT)
Age: 75
End: 2025-06-02

## 2025-07-18 ENCOUNTER — OUTPATIENT (OUTPATIENT)
Dept: OUTPATIENT SERVICES | Facility: HOSPITAL | Age: 75
LOS: 1 days | Discharge: HOME | End: 2025-07-18

## 2025-07-18 DIAGNOSIS — Z90.711 ACQUIRED ABSENCE OF UTERUS WITH REMAINING CERVICAL STUMP: Chronic | ICD-10-CM

## 2025-07-18 DIAGNOSIS — K11.8 OTHER DISEASES OF SALIVARY GLANDS: Chronic | ICD-10-CM

## 2025-07-18 DIAGNOSIS — C50.912 MALIGNANT NEOPLASM OF UNSPECIFIED SITE OF LEFT FEMALE BREAST: ICD-10-CM

## 2025-07-18 DIAGNOSIS — Z98.890 OTHER SPECIFIED POSTPROCEDURAL STATES: Chronic | ICD-10-CM

## 2025-07-19 ENCOUNTER — NON-APPOINTMENT (OUTPATIENT)
Age: 75
End: 2025-07-19

## 2025-07-21 ENCOUNTER — APPOINTMENT (OUTPATIENT)
Dept: HEMATOLOGY ONCOLOGY | Facility: CLINIC | Age: 75
End: 2025-07-21
Payer: MEDICAID

## 2025-07-21 VITALS
OXYGEN SATURATION: 98 % | RESPIRATION RATE: 16 BRPM | BODY MASS INDEX: 41.13 KG/M2 | SYSTOLIC BLOOD PRESSURE: 158 MMHG | HEART RATE: 63 BPM | TEMPERATURE: 98 F | DIASTOLIC BLOOD PRESSURE: 79 MMHG | WEIGHT: 224.87 LBS

## 2025-07-21 DIAGNOSIS — C50.912 MALIGNANT NEOPLASM OF UNSPECIFIED SITE OF LEFT FEMALE BREAST: ICD-10-CM

## 2025-07-21 PROCEDURE — 99214 OFFICE O/P EST MOD 30 MIN: CPT

## 2025-07-21 PROCEDURE — G2211 COMPLEX E/M VISIT ADD ON: CPT | Mod: NC

## 2025-07-21 RX ORDER — EXEMESTANE 25 MG/1
25 TABLET, FILM COATED ORAL DAILY
Qty: 30 | Refills: 5 | Status: ACTIVE | COMMUNITY
Start: 2025-07-21 | End: 1900-01-01

## 2025-08-27 ENCOUNTER — APPOINTMENT (OUTPATIENT)
Dept: MAMMOGRAPHY | Facility: IMAGING CENTER | Age: 75
End: 2025-08-27
Payer: MEDICAID

## 2025-08-27 ENCOUNTER — RESULT REVIEW (OUTPATIENT)
Age: 75
End: 2025-08-27

## 2025-08-27 ENCOUNTER — OUTPATIENT (OUTPATIENT)
Dept: OUTPATIENT SERVICES | Facility: HOSPITAL | Age: 75
LOS: 1 days | End: 2025-08-27
Payer: COMMERCIAL

## 2025-08-27 ENCOUNTER — APPOINTMENT (OUTPATIENT)
Dept: ULTRASOUND IMAGING | Facility: IMAGING CENTER | Age: 75
End: 2025-08-27
Payer: MEDICAID

## 2025-08-27 DIAGNOSIS — Z98.890 OTHER SPECIFIED POSTPROCEDURAL STATES: Chronic | ICD-10-CM

## 2025-08-27 DIAGNOSIS — Z90.711 ACQUIRED ABSENCE OF UTERUS WITH REMAINING CERVICAL STUMP: Chronic | ICD-10-CM

## 2025-08-27 DIAGNOSIS — C50.912 MALIGNANT NEOPLASM OF UNSPECIFIED SITE OF LEFT FEMALE BREAST: ICD-10-CM

## 2025-08-27 DIAGNOSIS — K11.8 OTHER DISEASES OF SALIVARY GLANDS: Chronic | ICD-10-CM

## 2025-08-27 PROCEDURE — 77066 DX MAMMO INCL CAD BI: CPT

## 2025-08-27 PROCEDURE — 77066 DX MAMMO INCL CAD BI: CPT | Mod: 26

## 2025-08-27 PROCEDURE — G0279: CPT | Mod: 26

## 2025-08-27 PROCEDURE — 76641 ULTRASOUND BREAST COMPLETE: CPT | Mod: 26,50

## 2025-08-27 PROCEDURE — 76641 ULTRASOUND BREAST COMPLETE: CPT

## 2025-08-27 PROCEDURE — G0279: CPT

## 2025-09-11 ENCOUNTER — APPOINTMENT (OUTPATIENT)
Dept: ORTHOPEDIC SURGERY | Facility: CLINIC | Age: 75
End: 2025-09-11
Payer: MEDICAID

## 2025-09-11 VITALS — WEIGHT: 220 LBS | BODY MASS INDEX: 40.48 KG/M2 | HEIGHT: 62 IN

## 2025-09-11 DIAGNOSIS — M25.562 PAIN IN LEFT KNEE: ICD-10-CM

## 2025-09-11 PROCEDURE — 99213 OFFICE O/P EST LOW 20 MIN: CPT | Mod: 25

## 2025-09-11 PROCEDURE — 20610 DRAIN/INJ JOINT/BURSA W/O US: CPT | Mod: LT

## 2025-09-11 PROCEDURE — 73562 X-RAY EXAM OF KNEE 3: CPT | Mod: LT

## 2025-09-11 PROCEDURE — A4649 SURGICAL SUPPLIES: CPT

## 2025-09-16 ENCOUNTER — APPOINTMENT (OUTPATIENT)
Dept: SURGICAL ONCOLOGY | Facility: CLINIC | Age: 75
End: 2025-09-16
Payer: MEDICAID

## 2025-09-16 VITALS
BODY MASS INDEX: 40.48 KG/M2 | WEIGHT: 220 LBS | OXYGEN SATURATION: 96 % | HEIGHT: 62 IN | DIASTOLIC BLOOD PRESSURE: 97 MMHG | SYSTOLIC BLOOD PRESSURE: 176 MMHG | HEART RATE: 76 BPM

## 2025-09-16 DIAGNOSIS — C50.912 MALIGNANT NEOPLASM OF UNSPECIFIED SITE OF LEFT FEMALE BREAST: ICD-10-CM

## 2025-09-16 PROCEDURE — 99214 OFFICE O/P EST MOD 30 MIN: CPT

## (undated) DEVICE — NDL HYPO SAFE 25G X 1" (ORANGE)

## (undated) DEVICE — WARMING BLANKET LOWER ADULT

## (undated) DEVICE — PACK MINOR NO DRAPE

## (undated) DEVICE — ELCTR BOVIE TIP BLADE INSULATED 4" EDGE

## (undated) DEVICE — DRSG TEGADERM 4X4.75"

## (undated) DEVICE — GOWN LG

## (undated) DEVICE — VENODYNE/SCD SLEEVE CALF MEDIUM

## (undated) DEVICE — ELCTR ROCKER SWITCH PENCIL BLUE 10FT

## (undated) DEVICE — DRSG MAMMARY SUPPORT MED SIZE 2

## (undated) DEVICE — SUT MONOCRYL 4-0 27" PS-2 UNDYED

## (undated) DEVICE — SOL IRR POUR H2O 500ML

## (undated) DEVICE — DRSG MAMMARY SUPPORT XL SIZE 5

## (undated) DEVICE — ELCTR GROUNDING PAD ADULT COVIDIEN

## (undated) DEVICE — RADIOGRAPHY DVC SPEC TRANSPEC

## (undated) DEVICE — DRSG TELFA 3 X 8

## (undated) DEVICE — DRSG MAMMARY SUPPORT LG SIZE 4

## (undated) DEVICE — SOL IRR POUR NS 0.9% 500ML

## (undated) DEVICE — DRAPE TOWEL BLUE 17" X 24"

## (undated) DEVICE — GLV 6.5 PROTEXIS (WHITE)

## (undated) DEVICE — POSITIONER PATIENT SAFETY STRAP 3X60"

## (undated) DEVICE — POSITIONER FOAM EGG CRATE ULNAR 2PCS (PINK)

## (undated) DEVICE — DRAPE LAPAROTOMY TRANSVERSE

## (undated) DEVICE — DRSG MAMMARY SUPPORT MED SIZE 3

## (undated) DEVICE — DRAPE INSTRUMENT POUCH 6.75" X 11"

## (undated) DEVICE — DRSG MAMMARY SUPPORT SM SIZE 1

## (undated) DEVICE — DRAPE 3/4 SHEET 52X76"

## (undated) DEVICE — SUT SILK 2-0 30" SH

## (undated) DEVICE — DRSG STERISTRIPS 0.5 X 4"

## (undated) DEVICE — GLV 6 PROTEXIS (WHITE)

## (undated) DEVICE — LAP PAD W RING 18 X 18"